# Patient Record
Sex: MALE | Race: WHITE | Employment: FULL TIME | URBAN - METROPOLITAN AREA
[De-identification: names, ages, dates, MRNs, and addresses within clinical notes are randomized per-mention and may not be internally consistent; named-entity substitution may affect disease eponyms.]

---

## 2018-06-29 ENCOUNTER — HOSPITAL ENCOUNTER (OUTPATIENT)
Dept: PREADMISSION TESTING | Age: 63
Discharge: HOME | End: 2018-06-29

## 2018-06-29 VITALS
WEIGHT: 225 LBS | OXYGEN SATURATION: 98 % | BODY MASS INDEX: 27.98 KG/M2 | DIASTOLIC BLOOD PRESSURE: 80 MMHG | HEART RATE: 63 BPM | RESPIRATION RATE: 20 BRPM | HEIGHT: 75 IN | SYSTOLIC BLOOD PRESSURE: 124 MMHG | TEMPERATURE: 97.9 F

## 2018-06-29 RX ORDER — ACETAMINOPHEN 500 MG
500 TABLET ORAL
COMMUNITY

## 2018-06-29 NOTE — PERIOP NOTES
[facility], [address]    MAIN OR [phone]    MAIN PRE OP [phone]    AMBULATORY PRE OP [phone]    PRE-ADMISSION TESTING [phone]       Surgery Date:   7/6/18      Is surgery arrival time given by surgeon? NO  If NO, [facility] staff will call you between 3 and 7pm the day before your surgery with your arrival time. (If your surgery is on a Monday, we will call you the Friday before.)    Call [phone] after 7pm Monday-Friday if you did not receive your arrival time.     Answers to Common Questions   When You  Arrive   Arrive at the [facility] [address] [facility] on the day of your surgery  Have your insurance card, photo ID, and any copayment (if needed)     Food   and   Drink   NO food or drink after midnight the night before surgery    This means NO water, gum, mints, coffee, juice, etc.  No alcohol (beer, wine, liquor) 24 hours before and after surgery     Medicine to   TAKE   Morning of Surgery   MEDICATIONS TO TAKE THE MORNING OF SURGERY WITH A SIP OF WATER:    none     Medicine  To  STOP   FOR PAIN   NO Aspirin for pain    NO Non-Steroidal Anti-Inflammatory Drugs (NSAIDs:   for example, Ibuprofen (Advil, Motrin), Naproxen (Aleve)   STOP herbal supplements and vitamins 1 week before surgery   You can take Tylenol - follow instructions on the bottle     Blood  Thinners    If you take Aspirin, Plavix, Coumadin, blood-thinning or anti-clot medicine, talk to your surgeon and/or follow the instructions from the doctor who told you to take that medicine     Clothing  Jewelry  Valuables  Bathing     CLOTHING   Wear loose, comfortable clothes   Wear glasses instead of contacts   Leave money, jewelry and valuables at home   No make-up, particularly mascara, the day of surgery   REMOVE ALL piercings, rings, and jewelry - leave at home   Wear hair loose or down; no pony-tails, buns, or metal hair clips    BATHING   Follow all special bath instructions (for total joint replacement, spine and bowel surgeries.)   If you shower the morning of surgery, please do not apply any lotions, powders, or deodorants afterwards. Do not shave or trim anywhere 24 hours before surgery. Going Home  or  Spending the Night    SAME-DAY SURGERY: You must have a responsible adult drive you home and stay with you 24 hours after surgery   ADMITS: If your doctor is keeping you into the hospital after surgery, leave personal belongings/luggage in your car until you have a hospital room number. Hospital discharge time is 12 noon  Drivers must be here before 12 noon unless you are told differently         Follow all instructions so your surgery wont be cancelled. Please, be on time. If a situation occurs and you are delayed the day of surgery, call  or [phone]. If your physical condition changes (like a fever, cold, flu, etc.) call your surgeon as soon as possible. The Preadmission Testing staff can be reached at 21 [phone]. OTHER SPECIAL INSTRUCTIONS:  Free  parking    The patient was contacted  in person. He  verbalize  understanding of all instructions and does not  need reinforcement.

## 2018-07-05 ENCOUNTER — ANESTHESIA EVENT (OUTPATIENT)
Dept: SURGERY | Age: 63
End: 2018-07-05
Payer: COMMERCIAL

## 2018-07-06 ENCOUNTER — HOSPITAL ENCOUNTER (OUTPATIENT)
Age: 63
Setting detail: OUTPATIENT SURGERY
Discharge: HOME | End: 2018-07-06
Attending: ORTHOPAEDIC SURGERY | Admitting: ORTHOPAEDIC SURGERY
Payer: COMMERCIAL

## 2018-07-06 ENCOUNTER — ANESTHESIA (OUTPATIENT)
Dept: SURGERY | Age: 63
End: 2018-07-06
Payer: COMMERCIAL

## 2018-07-06 VITALS
SYSTOLIC BLOOD PRESSURE: 132 MMHG | HEART RATE: 61 BPM | DIASTOLIC BLOOD PRESSURE: 83 MMHG | HEIGHT: 75 IN | RESPIRATION RATE: 15 BRPM | TEMPERATURE: 97.5 F | BODY MASS INDEX: 28.86 KG/M2 | OXYGEN SATURATION: 96 % | WEIGHT: 232.14 LBS

## 2018-07-06 PROCEDURE — A4565 SLINGS: HCPCS

## 2018-07-06 PROCEDURE — 74011000250 HC RX REV CODE- 250

## 2018-07-06 PROCEDURE — 76030000001 HC AMB SURG OR TIME 1 TO 1.5: Performed by: ORTHOPAEDIC SURGERY

## 2018-07-06 PROCEDURE — 74011250636 HC RX REV CODE- 250/636: Performed by: ORTHOPAEDIC SURGERY

## 2018-07-06 PROCEDURE — 76060000062 HC AMB SURG ANES 1 TO 1.5 HR: Performed by: ORTHOPAEDIC SURGERY

## 2018-07-06 PROCEDURE — 77030010816: Performed by: ORTHOPAEDIC SURGERY

## 2018-07-06 PROCEDURE — 64415 NJX AA&/STRD BRCH PLXS IMG: CPT

## 2018-07-06 PROCEDURE — 74011250636 HC RX REV CODE- 250/636

## 2018-07-06 PROCEDURE — 77030008495 HC TBNG ARTHSC IRR CNMD -B: Performed by: ORTHOPAEDIC SURGERY

## 2018-07-06 PROCEDURE — 74011250637 HC RX REV CODE- 250/637: Performed by: ORTHOPAEDIC SURGERY

## 2018-07-06 PROCEDURE — 77030032497 HC WRP SHLDR WO BGS SOLM -B

## 2018-07-06 PROCEDURE — 77030008684 HC TU ET CUF COVD -B: Performed by: ANESTHESIOLOGY

## 2018-07-06 PROCEDURE — 77030011640 HC PAD GRND REM COVD -A: Performed by: ORTHOPAEDIC SURGERY

## 2018-07-06 PROCEDURE — 77030002916 HC SUT ETHLN J&J -A: Performed by: ORTHOPAEDIC SURGERY

## 2018-07-06 PROCEDURE — 77030011930 HC WND ARTHRO ABLT S&N -C: Performed by: ORTHOPAEDIC SURGERY

## 2018-07-06 PROCEDURE — 77030003601 HC NDL NRV BLK BBMI -A

## 2018-07-06 PROCEDURE — 76210000050 HC AMBSU PH II REC 0.5 TO 1 HR: Performed by: ORTHOPAEDIC SURGERY

## 2018-07-06 PROCEDURE — 77030018835 HC SOL IRR LR ICUM -A: Performed by: ORTHOPAEDIC SURGERY

## 2018-07-06 PROCEDURE — 77030038020 HC MANFLD NEPTUNE STRY -B: Performed by: ORTHOPAEDIC SURGERY

## 2018-07-06 PROCEDURE — 77030019908 HC STETH ESOPH SIMS -A: Performed by: ANESTHESIOLOGY

## 2018-07-06 PROCEDURE — 77030020782 HC GWN BAIR PAWS FLX 3M -B

## 2018-07-06 PROCEDURE — 77030016678 HC BUR SHV4 S&N -B: Performed by: ORTHOPAEDIC SURGERY

## 2018-07-06 PROCEDURE — 77030006884 HC BLD SHV INCIS S&N -B: Performed by: ORTHOPAEDIC SURGERY

## 2018-07-06 PROCEDURE — 77030026438 HC STYL ET INTUB CARD -A: Performed by: ANESTHESIOLOGY

## 2018-07-06 PROCEDURE — 74011250636 HC RX REV CODE- 250/636: Performed by: ANESTHESIOLOGY

## 2018-07-06 PROCEDURE — 77030010428: Performed by: ORTHOPAEDIC SURGERY

## 2018-07-06 RX ORDER — PROPOFOL 10 MG/ML
INJECTION, EMULSION INTRAVENOUS AS NEEDED
Status: DISCONTINUED | OUTPATIENT
Start: 2018-07-06 | End: 2018-07-06 | Stop reason: HOSPADM

## 2018-07-06 RX ORDER — SODIUM CHLORIDE 0.9 % (FLUSH) 0.9 %
5-10 SYRINGE (ML) INJECTION AS NEEDED
Status: DISCONTINUED | OUTPATIENT
Start: 2018-07-06 | End: 2018-07-06 | Stop reason: HOSPADM

## 2018-07-06 RX ORDER — FENTANYL CITRATE 50 UG/ML
INJECTION, SOLUTION INTRAMUSCULAR; INTRAVENOUS AS NEEDED
Status: DISCONTINUED | OUTPATIENT
Start: 2018-07-06 | End: 2018-07-06 | Stop reason: HOSPADM

## 2018-07-06 RX ORDER — DEXAMETHASONE SODIUM PHOSPHATE 4 MG/ML
INJECTION, SOLUTION INTRA-ARTICULAR; INTRALESIONAL; INTRAMUSCULAR; INTRAVENOUS; SOFT TISSUE AS NEEDED
Status: DISCONTINUED | OUTPATIENT
Start: 2018-07-06 | End: 2018-07-06 | Stop reason: HOSPADM

## 2018-07-06 RX ORDER — HYDROMORPHONE HYDROCHLORIDE 2 MG/ML
.5-1 INJECTION, SOLUTION INTRAMUSCULAR; INTRAVENOUS; SUBCUTANEOUS
Status: DISCONTINUED | OUTPATIENT
Start: 2018-07-06 | End: 2018-07-06 | Stop reason: HOSPADM

## 2018-07-06 RX ORDER — ONDANSETRON 2 MG/ML
INJECTION INTRAMUSCULAR; INTRAVENOUS AS NEEDED
Status: DISCONTINUED | OUTPATIENT
Start: 2018-07-06 | End: 2018-07-06 | Stop reason: HOSPADM

## 2018-07-06 RX ORDER — MIDAZOLAM HYDROCHLORIDE 1 MG/ML
INJECTION, SOLUTION INTRAMUSCULAR; INTRAVENOUS AS NEEDED
Status: DISCONTINUED | OUTPATIENT
Start: 2018-07-06 | End: 2018-07-06 | Stop reason: HOSPADM

## 2018-07-06 RX ORDER — ONDANSETRON 2 MG/ML
4 INJECTION INTRAMUSCULAR; INTRAVENOUS AS NEEDED
Status: DISCONTINUED | OUTPATIENT
Start: 2018-07-06 | End: 2018-07-06 | Stop reason: HOSPADM

## 2018-07-06 RX ORDER — SODIUM CHLORIDE, SODIUM LACTATE, POTASSIUM CHLORIDE, CALCIUM CHLORIDE 600; 310; 30; 20 MG/100ML; MG/100ML; MG/100ML; MG/100ML
125 INJECTION, SOLUTION INTRAVENOUS CONTINUOUS
Status: DISCONTINUED | OUTPATIENT
Start: 2018-07-06 | End: 2018-07-06 | Stop reason: HOSPADM

## 2018-07-06 RX ORDER — SODIUM CHLORIDE 0.9 % (FLUSH) 0.9 %
5-10 SYRINGE (ML) INJECTION EVERY 8 HOURS
Status: DISCONTINUED | OUTPATIENT
Start: 2018-07-06 | End: 2018-07-06 | Stop reason: HOSPADM

## 2018-07-06 RX ORDER — LIDOCAINE HYDROCHLORIDE 20 MG/ML
INJECTION, SOLUTION EPIDURAL; INFILTRATION; INTRACAUDAL; PERINEURAL AS NEEDED
Status: DISCONTINUED | OUTPATIENT
Start: 2018-07-06 | End: 2018-07-06 | Stop reason: HOSPADM

## 2018-07-06 RX ORDER — ROCURONIUM BROMIDE 10 MG/ML
INJECTION, SOLUTION INTRAVENOUS AS NEEDED
Status: DISCONTINUED | OUTPATIENT
Start: 2018-07-06 | End: 2018-07-06 | Stop reason: HOSPADM

## 2018-07-06 RX ORDER — SUCCINYLCHOLINE CHLORIDE 20 MG/ML
INJECTION INTRAMUSCULAR; INTRAVENOUS AS NEEDED
Status: DISCONTINUED | OUTPATIENT
Start: 2018-07-06 | End: 2018-07-06 | Stop reason: HOSPADM

## 2018-07-06 RX ORDER — ROPIVACAINE HYDROCHLORIDE 5 MG/ML
INJECTION, SOLUTION EPIDURAL; INFILTRATION; PERINEURAL AS NEEDED
Status: DISCONTINUED | OUTPATIENT
Start: 2018-07-06 | End: 2018-07-06 | Stop reason: HOSPADM

## 2018-07-06 RX ORDER — LIDOCAINE HYDROCHLORIDE 10 MG/ML
0.1 INJECTION, SOLUTION EPIDURAL; INFILTRATION; INTRACAUDAL; PERINEURAL AS NEEDED
Status: DISCONTINUED | OUTPATIENT
Start: 2018-07-06 | End: 2018-07-06 | Stop reason: HOSPADM

## 2018-07-06 RX ORDER — CEFAZOLIN SODIUM/WATER 2 G/20 ML
2 SYRINGE (ML) INTRAVENOUS ONCE
Status: COMPLETED | OUTPATIENT
Start: 2018-07-06 | End: 2018-07-06

## 2018-07-06 RX ORDER — HYDROCODONE BITARTRATE AND ACETAMINOPHEN 7.5; 325 MG/1; MG/1
1 TABLET ORAL ONCE
Status: COMPLETED | OUTPATIENT
Start: 2018-07-06 | End: 2018-07-06

## 2018-07-06 RX ADMIN — HYDROCODONE BITARTRATE AND ACETAMINOPHEN 1 TABLET: 7.5; 325 TABLET ORAL at 10:41

## 2018-07-06 RX ADMIN — FENTANYL CITRATE 25 MCG: 50 INJECTION, SOLUTION INTRAMUSCULAR; INTRAVENOUS at 09:04

## 2018-07-06 RX ADMIN — FENTANYL CITRATE 50 MCG: 50 INJECTION, SOLUTION INTRAMUSCULAR; INTRAVENOUS at 07:02

## 2018-07-06 RX ADMIN — LIDOCAINE HYDROCHLORIDE 60 MG: 20 INJECTION, SOLUTION EPIDURAL; INFILTRATION; INTRACAUDAL; PERINEURAL at 07:50

## 2018-07-06 RX ADMIN — FENTANYL CITRATE 50 MCG: 50 INJECTION, SOLUTION INTRAMUSCULAR; INTRAVENOUS at 07:43

## 2018-07-06 RX ADMIN — ONDANSETRON 4 MG: 2 INJECTION INTRAMUSCULAR; INTRAVENOUS at 09:06

## 2018-07-06 RX ADMIN — SODIUM CHLORIDE, POTASSIUM CHLORIDE, SODIUM LACTATE AND CALCIUM CHLORIDE: 600; 310; 30; 20 INJECTION, SOLUTION INTRAVENOUS at 06:46

## 2018-07-06 RX ADMIN — FENTANYL CITRATE 25 MCG: 50 INJECTION, SOLUTION INTRAMUSCULAR; INTRAVENOUS at 09:06

## 2018-07-06 RX ADMIN — DEXAMETHASONE SODIUM PHOSPHATE 4 MG: 4 INJECTION, SOLUTION INTRA-ARTICULAR; INTRALESIONAL; INTRAMUSCULAR; INTRAVENOUS; SOFT TISSUE at 07:05

## 2018-07-06 RX ADMIN — ROCURONIUM BROMIDE 5 MG: 10 INJECTION, SOLUTION INTRAVENOUS at 07:50

## 2018-07-06 RX ADMIN — PROPOFOL 200 MG: 10 INJECTION, EMULSION INTRAVENOUS at 07:50

## 2018-07-06 RX ADMIN — MIDAZOLAM HYDROCHLORIDE 2 MG: 1 INJECTION, SOLUTION INTRAMUSCULAR; INTRAVENOUS at 06:59

## 2018-07-06 RX ADMIN — Medication 2 G: at 08:09

## 2018-07-06 RX ADMIN — FENTANYL CITRATE 50 MCG: 50 INJECTION, SOLUTION INTRAMUSCULAR; INTRAVENOUS at 06:59

## 2018-07-06 RX ADMIN — FENTANYL CITRATE 100 MCG: 50 INJECTION, SOLUTION INTRAMUSCULAR; INTRAVENOUS at 07:50

## 2018-07-06 RX ADMIN — DEXAMETHASONE SODIUM PHOSPHATE 4 MG: 4 INJECTION, SOLUTION INTRA-ARTICULAR; INTRALESIONAL; INTRAMUSCULAR; INTRAVENOUS; SOFT TISSUE at 08:19

## 2018-07-06 RX ADMIN — SUCCINYLCHOLINE CHLORIDE 100 MG: 20 INJECTION INTRAMUSCULAR; INTRAVENOUS at 07:50

## 2018-07-06 RX ADMIN — ROPIVACAINE HYDROCHLORIDE 30 ML: 5 INJECTION, SOLUTION EPIDURAL; INFILTRATION; PERINEURAL at 07:06

## 2018-07-06 NOTE — IP AVS SNAPSHOT
[address] 
[phone] Patient: Russ Irene MRN: [id number] LSO:[id number] About your hospitalization You were admitted on:  July 6, 2018 You last received care in the:  [facility] PACU You were discharged on:  July 6, 2018 Why you were hospitalized Your primary diagnosis was:  Not on File Follow-up Information Follow up With Details Comments Contact Info Provider Unknown   Patient not available to ask Discharge Orders None A check boubacar indicates which time of day the medication should be taken. My Medications CONTINUE taking these medications Instructions Each Dose to Equal  
 Morning Noon Evening Bedtime TYLENOL EXTRA STRENGTH 500 mg tablet Generic drug:  acetaminophen Your next dose is:  As needed Take 500 mg by mouth every six (6) hours as needed for Pain. 500 mg Discharge Instructions [address] [place] EFFECT GUIDE The [address] [place] EFFECT GUIDE was provided to the PATIENT AND CARE PROVIDER. Information provided includes instruction about drug purpose and common side effects for the following medications:  
   Niki Ripple AND IBUPROFEN Going Home After A Peripheral Nerve Block Patient Information The anesthesiology team has provided for your pain control through a technique called regional anesthesia. As the name implies, anesthesia (decreased or no pain, sensation, or motor control) has been provided to a specific region, whether that be an arm or a leg. How does this happen?  you might ask.  
 
While you were sleepy, the anesthesia provider provided medicine to a specific group of nerves either in the neck/shoulder region or in the groin and/or buttock region, similar to the way a dentist might numb a tooth (teeth.)  They typically use an ultrasound machine to know where the medicine is placed in relation to the nerves they wish to numb up or block.   What this means is that for the next few hours, you should expect to have a numb limb. Below are some things we wish for you to read and be familiar with concerning your anesthetized limb. Caring For a Blocked Body Part General Considerations: The numbness may last up to 24 hours You must protect your arm or leg. The blocked extremity is numb, weak, and difficult for your brain to locate and communicate with. To do this you should: 
Pay attention to the position of the blocked limb at all times. Be very careful when placing hot or cod items on a numb limb. You could cause tissue damage like burns or frostbite if you are unable to feel temperature. Carefully follow your discharge instructions regarding the use of these therapies in you post-operative care. Carefully pad the affected limb. Normally we continually move and adjust the position of our bodies without thinking about it. This movement and continuous repositioning helps to prevent injuries from immobility. When a limb is numb it still requires this care Be extremely careful not to bump or hit the numb body part. This can result in an unrecognized injury, at lease until the blocked limb wakes up. It can also result in worse pain of your already post-surgical limb. Arm/Shoulder Blocks: You may experience a droopy eyelid, nasal stuffiness, and redness of the eye after receiving an arm/shoulder block. This is called Fiorellas Syndrome, and is very common. There is no need for concern. You may also experience mild hoarseness, but all of these symptoms should resolve within 24 hours. Some patients may experience mild shortness of breath. A sitting position will help alleviate this and it should resolve within 24 hours.   If you experience significant or progressive worsening of the shortness of breath, seek medical attention immediately. Knee/Foot Blocks: 
DO NOT use the blocked leg to walk, balance or support yourself. Your leg will not be able to balance your weight properly while any part of your leg is numb. You are at a RISK for [address]. Be careful not to drag your foot along the ground or stub your toes. Contact Phone Numbers CALL 911 IN CASE OF AN EMERGENCY. For all other non-emergency inquiries call the [facility]  at [phone] and ask for the anesthesiologist on call to be paged. Going Home After A Peripheral Nerve Block Patient Information The anesthesiology team has provided for your pain control through a technique called regional anesthesia. As the name implies, anesthesia (decreased or no pain, sensation, or motor control) has been provided to a specific region, whether that be an arm or a leg. How does this happen?  you might ask. While you were sleepy, the anesthesia provider provided medicine to a specific group of nerves either in the neck/shoulder region or in the groin and/or buttock region, similar to the way a dentist might numb a tooth (teeth.)  They typically use an ultrasound machine to know where the medicine is placed in relation to the nerves they wish to numb up or block.   What this means is that for the next few hours, you should expect to have a numb limb. Below are some things we wish for you to read and be familiar with concerning your anesthetized limb. Caring For a Blocked Body Part General Considerations: The numbness may last up to 24 hours You must protect your arm or leg. The blocked extremity is numb, weak, and difficult for your brain to locate and communicate with. To do this you should: 
Pay attention to the position of the blocked limb at all times. Be very careful when placing hot or cod items on a numb limb. You could cause tissue damage like burns or frostbite if you are unable to feel temperature. Carefully follow your discharge instructions regarding the use of these therapies in you post-operative care. Carefully pad the affected limb. Normally we continually move and adjust the position of our bodies without thinking about it. This movement and continuous repositioning helps to prevent injuries from immobility. When a limb is numb it still requires this care Be extremely careful not to bump or hit the numb body part. This can result in an unrecognized injury, at lease until the blocked limb wakes up. It can also result in worse pain of your already post-surgical limb. Arm/Shoulder Blocks: You may experience a droopy eyelid, nasal stuffiness, and redness of the eye after receiving an arm/shoulder block. This is called Fiorellas Syndrome, and is very common. There is no need for concern. You may also experience mild hoarseness, but all of these symptoms should resolve within 24 hours. Some patients may experience mild shortness of breath. A sitting position will help alleviate this and it should resolve within 24 hours. If you experience significant or progressive worsening of the shortness of breath, seek medical attention immediately. Knee/Foot Blocks: 
DO NOT use the blocked leg to walk, balance or support yourself. Your leg will not be able to balance your weight properly while any part of your leg is numb. You are at a RISK for [address]. Be careful not to drag your foot along the ground or stub your toes. Contact Phone Numbers CALL 911 IN CASE OF AN EMERGENCY. For all other non-emergency inquiries call the [facility]  at [phone] and ask for the anesthesiologist on call to be paged. · Introducing [facility] [place] [facility]!    
 [employer] introduces XGIMI patient portal. Now you can access parts of your medical record, email your doctor's office, and request medication refills online. 1. In your internet browser, go to [email or web address] 2. Click on the First Time User? Click Here link in the Sign In box. You will see the New Member Sign Up page. 3. Enter your Syntricity Access Code exactly as it appears below. You will not need to use this code after youve completed the sign-up process. If you do not sign up before the expiration date, you must request a new code. · Syntricity Access Code: [email or web address] Expires: 9/27/2018  8:21 AM 
 
4. Enter the last four digits of your Social Security Number (xxxx) and Date of Birth (mm/dd/yyyy) as indicated and click Submit. You will be taken to the next sign-up page. 5. Create a Syntricity ID. This will be your Syntricity login ID and cannot be changed, so think of one that is secure and easy to remember. 6. Create a Syntricity password. You can change your password at any time. 7. Enter your Password Reset Question and Answer. This can be used at a later time if you forget your password. 8. Enter your e-mail address. You will receive e-mail notification when new information is available in [address] 9. Click Sign Up. You can now view and download portions of your medical record. 10. Click the Download Summary menu link to download a portable copy of your medical information. If you have questions, please visit the Frequently Asked Questions section of the Syntricity website. Remember, Syntricity is NOT to be used for urgent needs. For medical emergencies, dial 911. Now available from your iPhone and Android! Introducing Rao Reynolds As a [employer] patient, I wanted to make you aware of our electronic visit tool called Rao Reynolds. [employer] 24/7 allows you to connect within minutes with a medical provider 24 hours a day, seven days a week via a mobile device or tablet or logging into a secure website from your computer. You can access PaperShare from anywhere in the [place]. A virtual visit might be right for you when you have a simple condition and feel like you just dont want to get out of bed, or cant get away from work for an appointment, when your regular [address] [place] [address] provider is not available (evenings, weekends or holidays), or when youre out of town and need minor care. Electronic visits cost only $49 and if the [address] [place] [address] 24/7 provider determines a prescription is needed to treat your condition, one can be electronically transmitted to a nearby pharmacy*. Please take a moment to enroll today if you have not already done so. The enrollment process is free and takes just a few minutes. To enroll, please download the [employer] [facility] 24/7 manuel to your tablet or phone, or visit [email or web address] to enroll on your computer. And, as an [address] [place] [address] patient with a [email or web address].es account, the results of your visits will be scanned into your electronic medical record and your primary care provider will be able to view the scanned results. We urge you to continue to see your regular [address] [place] [address] provider for your ongoing medical care. And while your primary care provider may not be the one available when you seek a MedicAnimal.[email or web address] virtual visit, the peace of mind you get from getting a real diagnosis real time can be priceless. For more information on PaperShare, view our Frequently Asked Questions (FAQs) at [email or web address]. Sincerely, 
 
Joey Jolley MD 
Chief Medical Officer Darin8 Annmarie Aparicio *:  certain medications cannot be prescribed via MedicAnimal.comnifin Providers Seen During Your Hospitalization Provider Specialty Primary office phone Liberty Peterson MD Orthopedic Surgery [phone] Your Primary Care Physician (PCP) Primary Care Physician Office Phone Office Fax UNKNOWN, PROVIDER ** None ** ** None ** You are allergic to the following No active allergies Recent Documentation Height Weight BMI Smoking Status 1.905 m 105.3 kg 29.02 kg/m2 Current Every Day Smoker Emergency Contacts Name Discharge Info Relation Home Work Mobile [address] [employer] CAREGIVER [3] Spouse [3] [phone] Patient Belongings The following personal items are in your possession at time of discharge: 
  Dental Appliances: None         Home Medications: None   Jewelry: None  Clothing: Pants, Undergarments, Shirt, Footwear    Other Valuables: Eyeglasses (glasses given to wife in pre=-op area)  Personal Items Sent to Safe: none Please provide this summary of care documentation to your next provider. Signatures-by signing, you are acknowledging that this After Visit Summary has been reviewed with you and you have received a copy. Patient Signature:  ____________________________________________________________ Date:  ____________________________________________________________  
  
Andrés Uriostegui Provider Signature:  ____________________________________________________________ Date:  ____________________________________________________________

## 2018-07-06 NOTE — IP AVS SNAPSHOT
Summary of Care Report The Summary of Care report has been created to help improve care coordination. Users with access to [employer] or [address] (Web-based application) may access additional patient information including the Discharge Summary. If you are not currently a [address] user and need more information, please call the number listed below in the [place] [address] section and ask to be connected with Medical Records. Facility Information Name Address Phone [address] [facility] [id number] [phone] Patient Information Patient Name Sex  Matt Thomas ([id number]) Male 1955 Discharge Information Admitting Provider Service Area Unit Cory Blum MD / [facility] / [phone] Discharge Provider Discharge Date/Time Discharge Disposition Destination (none) 2018 Morning (Pending) AHR (none) Patient Language Language ENGLISH [13] You are allergic to the following No active allergies Current Discharge Medication List  
  
CONTINUE these medications which have NOT CHANGED Dose & Instructions Dispensing Information Comments TYLENOL EXTRA STRENGTH 500 mg tablet Generic drug:  acetaminophen Dose:  500 mg Take 500 mg by mouth every six (6) hours as needed for Pain. Refills:  0 Surgery Information ID Date/Time Status Primary Surgeon All Procedures Location  [id number] 2018 0800 Unposted Cory Blum MD RIGHT SHOULDER ARTHROSCOPY WITH SUBACROMIAL DECOMPRESSION DISTAL CLAVICLE RESECTION AND REMOVAL OF LOOSE BODIES SF AMBULATORY OR    
 RIGHT SHOULDER ARTHROSCOPY WITH SUBACROMIAL DECOMPRESSION DISTAL CLAVICLE RESECTION AND REMOVAL OF LOOSE BODIES:  RIGHT SHOULDER ARTHROSCOPY WITH SUBACROMIAL DECOMPRESSION DISTAL CLAVICLE RESECTION AND REMOVAL OF LOOSE BODIES Follow-up Information Follow up With Details Comments Contact Info Provider Unknown   Patient not available to ask Discharge Instructions [facility] EFFECT GUIDE The Ramy Hernandes EFFECT GUIDE was provided to the PATIENT AND CARE PROVIDER. Information provided includes instruction about drug purpose and common side effects for the following medications:  
   Lorrie Lopez AND IBUPROFEN Going Home After A Peripheral Nerve Block Patient Information The anesthesiology team has provided for your pain control through a technique called regional anesthesia. As the name implies, anesthesia (decreased or no pain, sensation, or motor control) has been provided to a specific region, whether that be an arm or a leg. How does this happen?  you might ask. While you were sleepy, the anesthesia provider provided medicine to a specific group of nerves either in the neck/shoulder region or in the groin and/or buttock region, similar to the way a dentist might numb a tooth (teeth.)  They typically use an ultrasound machine to know where the medicine is placed in relation to the nerves they wish to numb up or block.   What this means is that for the next few hours, you should expect to have a numb limb. Below are some things we wish for you to read and be familiar with concerning your anesthetized limb. Caring For a Blocked Body Part General Considerations: The numbness may last up to 24 hours You must protect your arm or leg. The blocked extremity is numb, weak, and difficult for your brain to locate and communicate with. To do this you should: 
Pay attention to the position of the blocked limb at all times. Be very careful when placing hot or cod items on a numb limb.   You could cause tissue damage like burns or frostbite if you are unable to feel temperature. Carefully follow your discharge instructions regarding the use of these therapies in you post-operative care. Carefully pad the affected limb. Normally we continually move and adjust the position of our bodies without thinking about it. This movement and continuous repositioning helps to prevent injuries from immobility. When a limb is numb it still requires this care Be extremely careful not to bump or hit the numb body part. This can result in an unrecognized injury, at lease until the blocked limb wakes up. It can also result in worse pain of your already post-surgical limb. Arm/Shoulder Blocks: You may experience a droopy eyelid, nasal stuffiness, and redness of the eye after receiving an arm/shoulder block. This is called Fiorellas Syndrome, and is very common. There is no need for concern. You may also experience mild hoarseness, but all of these symptoms should resolve within 24 hours. Some patients may experience mild shortness of breath. A sitting position will help alleviate this and it should resolve within 24 hours. If you experience significant or progressive worsening of the shortness of breath, seek medical attention immediately. Knee/Foot Blocks: 
DO NOT use the blocked leg to walk, balance or support yourself. Your leg will not be able to balance your weight properly while any part of your leg is numb. You are at a RISK for [address]. Be careful not to drag your foot along the ground or stub your toes. Contact Phone Numbers CALL 911 IN CASE OF AN EMERGENCY. For all other non-emergency inquiries call the [facility]  at [phone] and ask for the anesthesiologist on call to be paged. Going Home After A Peripheral Nerve Block Patient Information The anesthesiology team has provided for your pain control through a technique called regional anesthesia. As the name implies, anesthesia (decreased or no pain, sensation, or motor control) has been provided to a specific region, whether that be an arm or a leg. How does this happen?  you might ask. While you were sleepy, the anesthesia provider provided medicine to a specific group of nerves either in the neck/shoulder region or in the groin and/or buttock region, similar to the way a dentist might numb a tooth (teeth.)  They typically use an ultrasound machine to know where the medicine is placed in relation to the nerves they wish to numb up or block.   What this means is that for the next few hours, you should expect to have a numb limb. Below are some things we wish for you to read and be familiar with concerning your anesthetized limb. Caring For a Blocked Body Part General Considerations: The numbness may last up to 24 hours You must protect your arm or leg. The blocked extremity is numb, weak, and difficult for your brain to locate and communicate with. To do this you should: 
Pay attention to the position of the blocked limb at all times. Be very careful when placing hot or cod items on a numb limb. You could cause tissue damage like burns or frostbite if you are unable to feel temperature. Carefully follow your discharge instructions regarding the use of these therapies in you post-operative care. Carefully pad the affected limb. Normally we continually move and adjust the position of our bodies without thinking about it. This movement and continuous repositioning helps to prevent injuries from immobility. When a limb is numb it still requires this care Be extremely careful not to bump or hit the numb body part. This can result in an unrecognized injury, at lease until the blocked limb wakes up. It can also result in worse pain of your already post-surgical limb. Arm/Shoulder Blocks: You may experience a droopy eyelid, nasal stuffiness, and redness of the eye after receiving an arm/shoulder block. This is called Fiorellas Syndrome, and is very common. There is no need for concern. You may also experience mild hoarseness, but all of these symptoms should resolve within 24 hours. Some patients may experience mild shortness of breath. A sitting position will help alleviate this and it should resolve within 24 hours. If you experience significant or progressive worsening of the shortness of breath, seek medical attention immediately. Knee/Foot Blocks: 
DO NOT use the blocked leg to walk, balance or support yourself. Your leg will not be able to balance your weight properly while any part of your leg is numb. You are at a RISK for [address]. Be careful not to drag your foot along the ground or stub your toes. Contact Phone Numbers CALL 911 IN CASE OF AN EMERGENCY. For all other non-emergency inquiries call the [facility]  at [phone] and ask for the anesthesiologist on call to be paged. ·  
 
Chart Review Routing History No Routing History on File

## 2018-07-06 NOTE — ANESTHESIA PREPROCEDURE EVALUATION
Anesthetic History   No history of anesthetic complications            Review of Systems / Medical History  Patient summary reviewed, nursing notes reviewed and pertinent labs reviewed    Pulmonary  Within defined limits                 Neuro/Psych   Within defined limits           Cardiovascular  Within defined limits                     GI/Hepatic/Renal  Within defined limits         PUD     Endo/Other  Within defined limits           Other Findings              Physical Exam    Airway  Mallampati: II  TM Distance: 4 - 6 cm  Neck ROM: normal range of motion   Mouth opening: Normal     Cardiovascular    Rhythm: regular  Rate: normal         Dental  No notable dental hx       Pulmonary  Breath sounds clear to auscultation               Abdominal         Other Findings            Anesthetic Plan    ASA: 2  Anesthesia type: general      Post-op pain plan if not by surgeon: peripheral nerve block single      Anesthetic plan and risks discussed with: Patient

## 2018-07-06 NOTE — ANESTHESIA PROCEDURE NOTES
Peripheral Block    Start time: 7/6/2018 6:59 AM  End time: 7/6/2018 7:06 AM  Performed by: Leanne Mendoza  Authorized by: Kasi Hameed       Pre-procedure: Indications: at surgeon's request and post-op pain management    Preanesthetic Checklist: patient identified, risks and benefits discussed, site marked, timeout performed, anesthesia consent given and patient being monitored    Timeout Time: 06:59          Block Type:   Block Type:   Interscalene  Laterality:  Right  Monitoring:  Continuous pulse ox, frequent vital sign checks, heart rate, responsive to questions and oxygen  Injection Technique:  Single shot  Procedures: ultrasound guided    Patient Position: supine  Prep: chlorhexidine    Location:  Interscalene  Needle Type:  Stimuplex  Needle Gauge:  22 G  Needle Localization:  Nerve stimulator and ultrasound guidance  Medication Injected:  0.5%  ropivacaine  Volume (mL):  30    Assessment:  Number of attempts:  1  Injection Assessment:  Incremental injection every 5 mL, local visualized surrounding nerve on ultrasound, negative aspiration for blood, no paresthesia and no intravascular symptoms  Patient tolerance:  Patient tolerated the procedure well with no immediate complications  Decadron 4 mg added to block

## 2018-07-06 NOTE — OP NOTES
[facility] 81  OPERATIVE REPORT    Iman Pappas  MR#: [id number]  : 1955  ACCOUNT #: [de-identified]   DATE OF SERVICE: 2018    PREOPERATIVE DIAGNOSIS:    1. Right shoulder impingement syndrome. 2.  Right shoulder acromioclavicular joint arthritis. 3.  Right shoulder loose bodies. POSTOPERATIVE DIAGNOSES:   1. Right shoulder impingement syndrome. 2.  Right shoulder acromioclavicular joint arthritis. 3.  Right shoulder loose bodies. 4.  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 PROCEDURES PERFORMED:  1. Right shoulder arthroscopy with subacromial decompression. 2.  Right shoulder arthroscopy with distal clavicle resection. 3.  Right shoulder arthroscopy with extensive debridement of anterior, posterior labrum. 4.  Right shoulder arthroscopy with removal of loose bodies. SURGEON:  Devika Bergman MD      ASSISTANT:  Zaira    ANESTHESIA:  General plus block. COMPLICATIONS:  None. ESTIMATED BLOOD LOSS:  Minimal.    SPECIMENS REMOVED:  None. DRAINS:  None. IMPLANTS:  None. DESCRIPTION OF PROCEDURE:  The patient brought to the operating room, given general anesthesia, placed in a left lateral decubitus position on a beanbag. All bony prominences were protected. Head and neck aligned and protected. SCDs used, prepped and draped in a sterile fashion with ChloraPrep. Ten-pound suspension utilized. Posterior portal created. The arthroscope inserted into the joint and examined systematically, diffuse grade IV chondromalacia of the glenoid and humeral head with unstable tears of the labrum circumferentially. Undersurface of the rotator cuff intact, biceps tendon intact without subluxation. Extensive anterior and posterior debridement of the labrum through both anterior and posterior working portals. There were 3 loose bodies in the subscapularis recess that were removed.   An accessory portal had to be created anteriorly to remove the final loose body in the subscap recess. Loose bodies were greater than 5 mm in size. Next, the arthroscope redirected subacromially where there was fraying of the coracoacromial ligament and absence of articular cartilage at the [facility] [place], operated by [facility] joint with inferior spurring of the distal clavicle. The rotator cuff was carefully probed and it was intact. The coracoacromial ligament was released with a radiofrequency device. There was a type 3 acromial hook that was removed with a bur. The acromioplasty was performed with a cutting block technique. Next, a distal clavicle resection performed creating a 10 mm space between the clavicle and acromion while preserving superior and posterior ligaments. Subacromial space was irrigated. The portals were closed with nylon. Sterile dressing applied. Patient awakened, returned to recovery in stable condition. Family notified of his condition.       MD BONI Mahajan / [place]  D: 07/06/2018 09:14     T: 07/06/2018 10:02  JOB #: [id number]

## 2018-07-06 NOTE — BRIEF OP NOTE
BRIEF OPERATIVE NOTE    Date of Procedure: 7/6/2018   Preoperative Diagnosis: RIGHT SHOULDER IMPINGEMENT  Postoperative Diagnosis: RIGHT SHOULDER IMPINGEMENT, ACj DJD, LB's    Procedure(s):  RIGHT SHOULDER ARTHROSCOPY WITH SUBACROMIAL DECOMPRESSION DISTAL CLAVICLE RESECTION AND REMOVAL OF LOOSE BODIES  Surgeon(s) and Role:     * Nathaniel Freed MD - Primary         Surgical Assistant: Peggy Pinzon    Surgical Staff:  Circ-1: Wes Hameed RN  Scrub Tech-1: Isabella Ayers  Surg Asst-1: [facility]  Event Time In   Incision Start 5301   Incision Close      Anesthesia: General   Estimated Blood Loss: minimal  Specimens: * No specimens in log *   Findings: RIGHT SHOULDER IMPINGEMENT, ACj DJD, LB's   Complications: none  Implants: * No implants in log *

## 2018-07-06 NOTE — IP AVS SNAPSHOT
[address] [id number] Patient: Leobardo Chavira MRN: [id number] GWD:[id number]/[id number] A check boubacar indicates which time of day the medication should be taken. My Medications CONTINUE taking these medications Instructions Each Dose to Equal  
 Morning Noon Evening Bedtime TYLENOL EXTRA STRENGTH 500 mg tablet Generic drug:  acetaminophen Your next dose is:  As needed Take 500 mg by mouth every six (6) hours as needed for Pain.   
 500 mg

## 2018-07-06 NOTE — DISCHARGE INSTRUCTIONS
[facility] MEDICATION AND   SIDE EFFECT GUIDE    The [facility] MEDICATION AND SIDE EFFECT GUIDE was provided to the PATIENT AND CARE PROVIDER. Information provided includes instruction about drug purpose and common side effects for the following medications:      [address] After A  Peripheral Nerve Block    Patient Information    The anesthesiology team has provided for your pain control through a technique called regional anesthesia. As the name implies, anesthesia (decreased or no pain, sensation, or motor control) has been provided to a specific region, whether that be an arm or a leg. How does this happen?  you might ask. While you were sleepy, the anesthesia provider provided medicine to a specific group of nerves either in the neck/shoulder region or in the groin and/or buttock region, similar to the way a dentist might numb a tooth (teeth.)  They typically use an ultrasound machine to know where the medicine is placed in relation to the nerves they wish to numb up or block.   What this means is that for the next few hours, you should expect to have a numb limb. Below are some things we wish for you to read and be familiar with concerning your anesthetized limb. Caring For a Blocked Body Part    General Considerations: The numbness may last up to 24 hours  You must protect your arm or leg. The blocked extremity is numb, weak, and difficult for your brain to locate and communicate with. To do this you should:  Pay attention to the position of the blocked limb at all times. Be very careful when placing hot or cod items on a numb limb. You could cause tissue damage like burns or frostbite if you are unable to feel temperature. Carefully follow your discharge instructions regarding the use of these therapies in you post-operative care. Carefully pad the affected limb.   Normally we continually move and adjust the position of our bodies without thinking about it. This movement and continuous repositioning helps to prevent injuries from immobility. When a limb is numb it still requires this care  Be extremely careful not to bump or hit the numb body part. This can result in an unrecognized injury, at lease until the blocked limb wakes up. It can also result in worse pain of your already post-surgical limb. Arm/Shoulder Blocks: You may experience a droopy eyelid, nasal stuffiness, and redness of the eye after receiving an arm/shoulder block. This is called Fiorellas Syndrome, and is very common. There is no need for concern. You may also experience mild hoarseness, but all of these symptoms should resolve within 24 hours. Some patients may experience mild shortness of breath. A sitting position will help alleviate this and it should resolve within 24 hours. If you experience significant or progressive worsening of the shortness of breath, seek medical attention immediately. Knee/Foot Blocks:  DO NOT use the blocked leg to walk, balance or support yourself. Your leg will not be able to balance your weight properly while any part of your leg is numb. You are at a RISK for [address]. Be careful not to drag your foot along the ground or stub your toes. Contact Phone Numbers    CALL 911 IN CASE OF AN EMERGENCY. For all other non-emergency inquiries call the [facility]  at [phone] and ask for the anesthesiologist on call to be paged. Going Home After A  Peripheral Nerve Block    Patient Information    The anesthesiology team has provided for your pain control through a technique called regional anesthesia. As the name implies, anesthesia (decreased or no pain, sensation, or motor control) has been provided to a specific region, whether that be an arm or a leg. How does this happen?  you might ask.     While you were sleepy, the anesthesia provider provided medicine to a specific group of nerves either in the neck/shoulder region or in the groin and/or buttock region, similar to the way a dentist might numb a tooth (teeth.)  They typically use an ultrasound machine to know where the medicine is placed in relation to the nerves they wish to numb up or block.   What this means is that for the next few hours, you should expect to have a numb limb. Below are some things we wish for you to read and be familiar with concerning your anesthetized limb. Caring For a Blocked Body Part    General Considerations: The numbness may last up to 24 hours  You must protect your arm or leg. The blocked extremity is numb, weak, and difficult for your brain to locate and communicate with. To do this you should:  Pay attention to the position of the blocked limb at all times. Be very careful when placing hot or cod items on a numb limb. You could cause tissue damage like burns or frostbite if you are unable to feel temperature. Carefully follow your discharge instructions regarding the use of these therapies in you post-operative care. Carefully pad the affected limb. Normally we continually move and adjust the position of our bodies without thinking about it. This movement and continuous repositioning helps to prevent injuries from immobility. When a limb is numb it still requires this care  Be extremely careful not to bump or hit the numb body part. This can result in an unrecognized injury, at lease until the blocked limb wakes up. It can also result in worse pain of your already post-surgical limb. Arm/Shoulder Blocks: You may experience a droopy eyelid, nasal stuffiness, and redness of the eye after receiving an arm/shoulder block. This is called Fiorellas Syndrome, and is very common. There is no need for concern. You may also experience mild hoarseness, but all of these symptoms should resolve within 24 hours. Some patients may experience mild shortness of breath.   A sitting position will help alleviate this and it should resolve within 24 hours. If you experience significant or progressive worsening of the shortness of breath, seek medical attention immediately. Knee/Foot Blocks:  DO NOT use the blocked leg to walk, balance or support yourself. Your leg will not be able to balance your weight properly while any part of your leg is numb. You are at a RISK for [address]. Be careful not to drag your foot along the ground or stub your toes. Contact Phone Numbers    CALL 911 IN CASE OF AN EMERGENCY. For all other non-emergency inquiries call the [facility]  at [phone] and ask for the anesthesiologist on call to be paged.   ·

## 2018-07-06 NOTE — ANESTHESIA POSTPROCEDURE EVALUATION
Post-Anesthesia Evaluation and Assessment    Patient: Jovon Ro MRN: [id number]  SSN: [id number]    YOB: 1955  Age: 66 y.o. Sex: male       Cardiovascular Function/Vital Signs  Visit Vitals    /83    Pulse 61    Temp 36.4 °C (97.5 °F)    Resp 15    Ht 6' 3\" (1.905 m)    Wt 105.3 kg (232 lb 2.3 oz)    SpO2 96%    BMI 29.02 kg/m2       Patient is status post general anesthesia for Procedure(s):  RIGHT SHOULDER ARTHROSCOPY WITH SUBACROMIAL DECOMPRESSION DISTAL CLAVICLE RESECTION AND REMOVAL OF LOOSE BODIES. Nausea/Vomiting: None    Postoperative hydration reviewed and adequate. Pain:  Pain Scale 1: Numeric (0 - 10) (07/06/18 0925)  Pain Intensity 1: 0 (07/06/18 0925)   Managed    Neurological Status:   Neuro (WDL): Exceptions to WDL (07/06/18 0925)  Neuro  LUE Motor Response: Purposeful (07/06/18 0925)  LLE Motor Response: Purposeful (07/06/18 0925)  RUE Motor Response: Floppy (07/06/18 0925)  RLE Motor Response: Purposeful (07/06/18 0925)   At baseline    Mental Status and Level of Consciousness: Arousable    Pulmonary Status:   O2 Device: Nasal cannula (07/06/18 0925)   Adequate oxygenation and airway patent    Complications related to anesthesia: None    Post-anesthesia assessment completed.  No concerns    Signed By: Saad Reyes MD     July 6, 2018

## 2022-05-27 ENCOUNTER — APPOINTMENT (OUTPATIENT)
Dept: GENERAL RADIOLOGY | Age: 67
End: 2022-05-27
Attending: EMERGENCY MEDICINE
Payer: COMMERCIAL

## 2022-05-27 ENCOUNTER — HOSPITAL ENCOUNTER (EMERGENCY)
Age: 67
Discharge: ANOTHER HOSPITAL | End: 2022-05-27
Attending: EMERGENCY MEDICINE
Payer: COMMERCIAL

## 2022-05-27 ENCOUNTER — HOSPITAL ENCOUNTER (OUTPATIENT)
Age: 67
Setting detail: OBSERVATION
Discharge: HOME | End: 2022-05-29
Attending: EMERGENCY MEDICINE | Admitting: STUDENT IN AN ORGANIZED HEALTH CARE EDUCATION/TRAINING PROGRAM
Payer: COMMERCIAL

## 2022-05-27 ENCOUNTER — APPOINTMENT (OUTPATIENT)
Dept: NON INVASIVE DIAGNOSTICS | Age: 67
End: 2022-05-27
Attending: EMERGENCY MEDICINE
Payer: COMMERCIAL

## 2022-05-27 ENCOUNTER — APPOINTMENT (OUTPATIENT)
Dept: CT IMAGING | Age: 67
End: 2022-05-27
Attending: SURGERY
Payer: COMMERCIAL

## 2022-05-27 VITALS
HEIGHT: 75 IN | TEMPERATURE: 97.8 F | HEART RATE: 70 BPM | DIASTOLIC BLOOD PRESSURE: 78 MMHG | RESPIRATION RATE: 16 BRPM | OXYGEN SATURATION: 98 % | BODY MASS INDEX: 30.09 KG/M2 | WEIGHT: 242 LBS | SYSTOLIC BLOOD PRESSURE: 135 MMHG

## 2022-05-27 DIAGNOSIS — I70.209 FEMORAL ARTERY OCCLUSION (HCC): Primary | ICD-10-CM

## 2022-05-27 DIAGNOSIS — I99.8 ISCHEMIA OF RIGHT LOWER EXTREMITY: ICD-10-CM

## 2022-05-27 LAB
ALBUMIN SERPL-MCNC: 3.4 G/DL (ref 3.5–5)
ALBUMIN SERPL-MCNC: 3.6 G/DL (ref 3.5–5)
ALBUMIN/GLOB SERPL: 0.8 {RATIO} (ref 1.1–2.2)
ALBUMIN/GLOB SERPL: 1.1 {RATIO} (ref 1.1–2.2)
ALP SERPL-CCNC: 118 U/L (ref 45–117)
ALP SERPL-CCNC: 145 U/L (ref 45–117)
ALT SERPL-CCNC: 105 U/L (ref 12–78)
ALT SERPL-CCNC: 20 U/L (ref 12–78)
ANION GAP SERPL CALC-SCNC: 4 MMOL/L (ref 5–15)
ANION GAP SERPL CALC-SCNC: 5 MMOL/L (ref 5–15)
APTT PPP: 26.8 SEC (ref 21.2–34.1)
APTT PPP: 93.9 SEC (ref 22.1–31)
AST SERPL W P-5'-P-CCNC: 15 U/L (ref 15–37)
AST SERPL-CCNC: 130 U/L (ref 15–37)
BASOPHILS # BLD: 0.2 K/UL (ref 0–0.1)
BASOPHILS # BLD: 0.3 K/UL (ref 0–0.1)
BASOPHILS NFR BLD: 1 % (ref 0–1)
BASOPHILS NFR BLD: 2 % (ref 0–1)
BILIRUB SERPL-MCNC: 0.5 MG/DL (ref 0.2–1)
BILIRUB SERPL-MCNC: 0.9 MG/DL (ref 0.2–1)
BUN SERPL-MCNC: 11 MG/DL (ref 6–20)
BUN SERPL-MCNC: 12 MG/DL (ref 6–20)
BUN/CREAT SERPL: 12 (ref 12–20)
BUN/CREAT SERPL: 12 (ref 12–20)
CA-I BLD-MCNC: 9 MG/DL (ref 8.5–10.1)
CALCIUM SERPL-MCNC: 8.9 MG/DL (ref 8.5–10.1)
CHLORIDE SERPL-SCNC: 105 MMOL/L (ref 97–108)
CHLORIDE SERPL-SCNC: 106 MMOL/L (ref 97–108)
CK SERPL-CCNC: 100 U/L (ref 39–308)
CO2 SERPL-SCNC: 26 MMOL/L (ref 21–32)
CO2 SERPL-SCNC: 28 MMOL/L (ref 21–32)
CREAT SERPL-MCNC: 0.94 MG/DL (ref 0.7–1.3)
CREAT SERPL-MCNC: 1.01 MG/DL (ref 0.7–1.3)
DIFFERENTIAL METHOD BLD: ABNORMAL
DIFFERENTIAL METHOD BLD: ABNORMAL
EOSINOPHIL # BLD: 0 K/UL (ref 0–0.4)
EOSINOPHIL # BLD: 0.2 K/UL (ref 0–0.4)
EOSINOPHIL NFR BLD: 0 % (ref 0–7)
EOSINOPHIL NFR BLD: 1 % (ref 0–7)
ERYTHROCYTE [DISTWIDTH] IN BLOOD BY AUTOMATED COUNT: 14 % (ref 11.5–14.5)
ERYTHROCYTE [DISTWIDTH] IN BLOOD BY AUTOMATED COUNT: 14 % (ref 11.5–14.5)
GLOBULIN SER CALC-MCNC: 3.3 G/DL (ref 2–4)
GLOBULIN SER CALC-MCNC: 4.2 G/DL (ref 2–4)
GLUCOSE SERPL-MCNC: 85 MG/DL (ref 65–100)
GLUCOSE SERPL-MCNC: 95 MG/DL (ref 65–100)
HCT VFR BLD AUTO: 49.6 % (ref 36.6–50.3)
HCT VFR BLD AUTO: 51.1 % (ref 36.6–50.3)
HGB BLD-MCNC: 17.1 G/DL (ref 12.1–17)
HGB BLD-MCNC: 17.4 G/DL (ref 12.1–17)
IMM GRANULOCYTES # BLD AUTO: 0 K/UL
IMM GRANULOCYTES # BLD AUTO: 0 K/UL
IMM GRANULOCYTES NFR BLD AUTO: 0 %
IMM GRANULOCYTES NFR BLD AUTO: 0 %
INR PPP: 0.9 (ref 0.9–1.1)
LACTATE SERPL-SCNC: 0.6 MMOL/L (ref 0.4–2)
LYMPHOCYTES # BLD: 5 K/UL (ref 0.8–3.5)
LYMPHOCYTES # BLD: 6.6 K/UL (ref 0.8–3.5)
LYMPHOCYTES NFR BLD: 33 % (ref 12–49)
LYMPHOCYTES NFR BLD: 51 % (ref 12–49)
MCH RBC QN AUTO: 31.5 PG (ref 26–34)
MCH RBC QN AUTO: 31.6 PG (ref 26–34)
MCHC RBC AUTO-ENTMCNC: 34.1 G/DL (ref 30–36.5)
MCHC RBC AUTO-ENTMCNC: 34.5 G/DL (ref 30–36.5)
MCV RBC AUTO: 91.5 FL (ref 80–99)
MCV RBC AUTO: 92.9 FL (ref 80–99)
MONOCYTES # BLD: 0.8 K/UL (ref 0–1)
MONOCYTES # BLD: 1.2 K/UL (ref 0–1)
MONOCYTES NFR BLD: 5 % (ref 5–13)
MONOCYTES NFR BLD: 9 % (ref 5–13)
NEUTS BAND NFR BLD MANUAL: 1 % (ref 0–6)
NEUTS SEG # BLD: 4.9 K/UL (ref 1.8–8)
NEUTS SEG # BLD: 8.8 K/UL (ref 1.8–8)
NEUTS SEG NFR BLD: 37 % (ref 32–75)
NEUTS SEG NFR BLD: 60 % (ref 32–75)
NRBC BLD-RTO: 0 PER 100 WBC
PLATELET # BLD AUTO: 278 K/UL (ref 150–400)
PLATELET # BLD AUTO: 284 K/UL (ref 150–400)
PMV BLD AUTO: 10.6 FL (ref 8.9–12.9)
PMV BLD AUTO: 10.7 FL (ref 8.9–12.9)
POTASSIUM SERPL-SCNC: 3.8 MMOL/L (ref 3.5–5.1)
POTASSIUM SERPL-SCNC: 4.3 MMOL/L (ref 3.5–5.1)
PROT SERPL-MCNC: 6.9 G/DL (ref 6.4–8.2)
PROT SERPL-MCNC: 7.6 G/DL (ref 6.4–8.2)
PROTHROMBIN TIME: 12.6 SEC (ref 11.9–14.6)
RBC # BLD AUTO: 5.42 M/UL (ref 4.1–5.7)
RBC # BLD AUTO: 5.5 M/UL (ref 4.1–5.7)
RBC MORPH BLD: ABNORMAL
RBC MORPH BLD: ABNORMAL
SODIUM SERPL-SCNC: 136 MMOL/L (ref 136–145)
SODIUM SERPL-SCNC: 138 MMOL/L (ref 136–145)
THERAPEUTIC RANGE,PTTT: ABNORMAL SECS (ref 58–77)
THERAPEUTIC RANGE,PTTT: NORMAL SEC (ref 82–109)
WBC # BLD AUTO: 13 K/UL (ref 4.1–11.1)
WBC # BLD AUTO: 15 K/UL (ref 4.1–11.1)
WBC MORPH BLD: ABNORMAL

## 2022-05-27 PROCEDURE — 83605 ASSAY OF LACTIC ACID: CPT

## 2022-05-27 PROCEDURE — 74011250637 HC RX REV CODE- 250/637: Performed by: EMERGENCY MEDICINE

## 2022-05-27 PROCEDURE — 36415 COLL VENOUS BLD VENIPUNCTURE: CPT

## 2022-05-27 PROCEDURE — 74011000636 HC RX REV CODE- 636: Performed by: RADIOLOGY

## 2022-05-27 PROCEDURE — 74011250636 HC RX REV CODE- 250/636: Performed by: STUDENT IN AN ORGANIZED HEALTH CARE EDUCATION/TRAINING PROGRAM

## 2022-05-27 PROCEDURE — 73630 X-RAY EXAM OF FOOT: CPT

## 2022-05-27 PROCEDURE — 85730 THROMBOPLASTIN TIME PARTIAL: CPT

## 2022-05-27 PROCEDURE — 82550 ASSAY OF CK (CPK): CPT

## 2022-05-27 PROCEDURE — 75635 CT ANGIO ABDOMINAL ARTERIES: CPT

## 2022-05-27 PROCEDURE — 80053 COMPREHEN METABOLIC PANEL: CPT

## 2022-05-27 PROCEDURE — 93005 ELECTROCARDIOGRAM TRACING: CPT

## 2022-05-27 PROCEDURE — 85610 PROTHROMBIN TIME: CPT

## 2022-05-27 PROCEDURE — 74011250636 HC RX REV CODE- 250/636: Performed by: EMERGENCY MEDICINE

## 2022-05-27 PROCEDURE — 96374 THER/PROPH/DIAG INJ IV PUSH: CPT

## 2022-05-27 PROCEDURE — G0378 HOSPITAL OBSERVATION PER HR: HCPCS

## 2022-05-27 PROCEDURE — 71275 CT ANGIOGRAPHY CHEST: CPT

## 2022-05-27 PROCEDURE — 99285 EMERGENCY DEPT VISIT HI MDM: CPT

## 2022-05-27 PROCEDURE — 85025 COMPLETE CBC W/AUTO DIFF WBC: CPT

## 2022-05-27 PROCEDURE — 74011000250 HC RX REV CODE- 250: Performed by: STUDENT IN AN ORGANIZED HEALTH CARE EDUCATION/TRAINING PROGRAM

## 2022-05-27 PROCEDURE — 96365 THER/PROPH/DIAG IV INF INIT: CPT

## 2022-05-27 PROCEDURE — 96375 TX/PRO/DX INJ NEW DRUG ADDON: CPT

## 2022-05-27 PROCEDURE — 93926 LOWER EXTREMITY STUDY: CPT

## 2022-05-27 PROCEDURE — 96366 THER/PROPH/DIAG IV INF ADDON: CPT

## 2022-05-27 RX ORDER — MORPHINE SULFATE 4 MG/ML
4 INJECTION INTRAVENOUS ONCE
Status: COMPLETED | OUTPATIENT
Start: 2022-05-27 | End: 2022-05-27

## 2022-05-27 RX ORDER — HEPARIN SODIUM 10000 [USP'U]/100ML
15-36 INJECTION, SOLUTION INTRAVENOUS
Status: DISCONTINUED | OUTPATIENT
Start: 2022-05-27 | End: 2022-05-27 | Stop reason: HOSPADM

## 2022-05-27 RX ORDER — ONDANSETRON 2 MG/ML
4 INJECTION INTRAMUSCULAR; INTRAVENOUS
Status: DISCONTINUED | OUTPATIENT
Start: 2022-05-27 | End: 2022-05-29 | Stop reason: HOSPADM

## 2022-05-27 RX ORDER — SODIUM CHLORIDE 9 MG/ML
150 INJECTION, SOLUTION INTRAVENOUS CONTINUOUS
Status: DISCONTINUED | OUTPATIENT
Start: 2022-05-27 | End: 2022-05-29 | Stop reason: HOSPADM

## 2022-05-27 RX ORDER — SODIUM CHLORIDE 0.9 % (FLUSH) 0.9 %
5-40 SYRINGE (ML) INJECTION EVERY 8 HOURS
Status: DISCONTINUED | OUTPATIENT
Start: 2022-05-27 | End: 2022-05-29 | Stop reason: HOSPADM

## 2022-05-27 RX ORDER — HEPARIN SODIUM 1000 [USP'U]/ML
80 INJECTION, SOLUTION INTRAVENOUS; SUBCUTANEOUS AS NEEDED
Status: DISCONTINUED | OUTPATIENT
Start: 2022-05-27 | End: 2022-05-27 | Stop reason: HOSPADM

## 2022-05-27 RX ORDER — MORPHINE SULFATE 4 MG/ML
4 INJECTION INTRAVENOUS
Status: COMPLETED | OUTPATIENT
Start: 2022-05-27 | End: 2022-05-27

## 2022-05-27 RX ORDER — ACETAMINOPHEN 325 MG/1
650 TABLET ORAL
Status: DISCONTINUED | OUTPATIENT
Start: 2022-05-27 | End: 2022-05-29 | Stop reason: HOSPADM

## 2022-05-27 RX ORDER — ONDANSETRON 4 MG/1
4 TABLET, ORALLY DISINTEGRATING ORAL
Status: DISCONTINUED | OUTPATIENT
Start: 2022-05-27 | End: 2022-05-29 | Stop reason: HOSPADM

## 2022-05-27 RX ORDER — HEPARIN SODIUM 1000 [USP'U]/ML
80 INJECTION, SOLUTION INTRAVENOUS; SUBCUTANEOUS ONCE
Status: COMPLETED | OUTPATIENT
Start: 2022-05-27 | End: 2022-05-27

## 2022-05-27 RX ORDER — SODIUM CHLORIDE 0.9 % (FLUSH) 0.9 %
5-40 SYRINGE (ML) INJECTION AS NEEDED
Status: DISCONTINUED | OUTPATIENT
Start: 2022-05-27 | End: 2022-05-29 | Stop reason: HOSPADM

## 2022-05-27 RX ORDER — ACETAMINOPHEN 650 MG/1
650 SUPPOSITORY RECTAL
Status: DISCONTINUED | OUTPATIENT
Start: 2022-05-27 | End: 2022-05-29 | Stop reason: HOSPADM

## 2022-05-27 RX ORDER — POLYETHYLENE GLYCOL 3350 17 G/17G
17 POWDER, FOR SOLUTION ORAL DAILY PRN
Status: DISCONTINUED | OUTPATIENT
Start: 2022-05-27 | End: 2022-05-29 | Stop reason: HOSPADM

## 2022-05-27 RX ORDER — HEPARIN SODIUM 1000 [USP'U]/ML
40 INJECTION, SOLUTION INTRAVENOUS; SUBCUTANEOUS AS NEEDED
Status: DISCONTINUED | OUTPATIENT
Start: 2022-05-27 | End: 2022-05-27 | Stop reason: HOSPADM

## 2022-05-27 RX ORDER — OXYCODONE AND ACETAMINOPHEN 5; 325 MG/1; MG/1
2 TABLET ORAL
Status: COMPLETED | OUTPATIENT
Start: 2022-05-27 | End: 2022-05-27

## 2022-05-27 RX ADMIN — MORPHINE SULFATE 4 MG: 4 INJECTION, SOLUTION INTRAMUSCULAR; INTRAVENOUS at 19:33

## 2022-05-27 RX ADMIN — Medication 10 ML: at 22:40

## 2022-05-27 RX ADMIN — NITROGLYCERIN 1 INCH: 20 OINTMENT TOPICAL at 14:33

## 2022-05-27 RX ADMIN — Medication 13 UNITS/KG/HR: at 19:20

## 2022-05-27 RX ADMIN — OXYCODONE AND ACETAMINOPHEN 2 TABLET: 5; 325 TABLET ORAL at 13:41

## 2022-05-27 RX ADMIN — IOPAMIDOL 97 ML: 755 INJECTION, SOLUTION INTRAVENOUS at 22:02

## 2022-05-27 RX ADMIN — HEPARIN SODIUM 15 UNITS/KG/HR: 10000 INJECTION, SOLUTION INTRAVENOUS at 17:01

## 2022-05-27 RX ADMIN — SODIUM CHLORIDE 150 ML/HR: 9 INJECTION, SOLUTION INTRAVENOUS at 23:30

## 2022-05-27 RX ADMIN — MORPHINE SULFATE 4 MG: 4 INJECTION, SOLUTION INTRAMUSCULAR; INTRAVENOUS at 15:56

## 2022-05-27 RX ADMIN — IOPAMIDOL 76 ML: 755 INJECTION, SOLUTION INTRAVENOUS at 22:02

## 2022-05-27 RX ADMIN — HEPARIN SODIUM 8780 UNITS: 1000 INJECTION, SOLUTION INTRAVENOUS; SUBCUTANEOUS at 16:57

## 2022-05-27 NOTE — ED PROVIDER NOTES
EMERGENCY DEPARTMENT HISTORY AND PHYSICAL EXAM      Date: 5/27/2022  Patient Name: Beth Keyes    History of Presenting Illness     Chief Complaint   Patient presents with    Foot Pain       History Provided By: Patient    HPI: Beth Keyes, 74 y.o. male with a past medical history significant No significant past medical history presents to the ED with cc of right fifth toe pain. Patient says been going on for couple of weeks. He was treated by his PCP for gout and then started on antibiotics 2 days ago but appears is getting worse and now its purple. Pain is made worse with movement relieved with nothing. There are no other complaints, changes, or physical findings at this time. PCP: Deondre Epstein., NP    No current facility-administered medications on file prior to encounter. Current Outpatient Medications on File Prior to Encounter   Medication Sig Dispense Refill    acetaminophen (TYLENOL EXTRA STRENGTH) 500 mg tablet Take 500 mg by mouth every six (6) hours as needed for Pain. Past History     Past Medical History:  Past Medical History:   Diagnosis Date    Arthritis     JRA age 7    Chronic pain     PUD (peptic ulcer disease)        Past Surgical History:  Past Surgical History:   Procedure Laterality Date    HX ORTHOPAEDIC Bilateral 1977    reconstruction of both elbows and wrists  with debridement X3    HX ORTHOPAEDIC Right 1977    fractured femur    HX ORTHOPAEDIC Right 2005    repair of fractured patella    HX OTHER SURGICAL  1977    splenectomy, lacerated pancreas    HX TONSILLECTOMY      [place] ABDOMEN SURGERY PROC UNLISTED      cholecystectomy    IA ABDOMEN SURGERY PROC UNLISTED      lysis of adhesions X3       Family History:  History reviewed. No pertinent family history.     Social History:  Social History     Tobacco Use    Smoking status: Current Every Day Smoker     Packs/day: 1.00     Years: 45.00     Pack years: 45.00    Smokeless tobacco: Never Used   Substance Use Topics    Alcohol use: No    Drug use: No       Allergies:  No Known Allergies      Review of Systems     Review of Systems   Constitutional: Negative. Negative for appetite change, chills, fatigue and fever. HENT: Negative. Negative for congestion and sinus pain. Eyes: Negative. Negative for pain and visual disturbance. Respiratory: Negative. Negative for chest tightness and shortness of breath. Cardiovascular: Negative. Negative for chest pain. Gastrointestinal: Negative. Negative for abdominal pain, diarrhea, nausea and vomiting. Genitourinary: Negative. Negative for difficulty urinating. No discharge   Musculoskeletal: Negative. Negative for arthralgias. Skin: Positive for wound. Negative for rash. Neurological: Negative. Negative for weakness and headaches. Hematological: Negative. Psychiatric/Behavioral: Negative. Negative for agitation. The patient is not nervous/anxious. All other systems reviewed and are negative. Physical Exam     Physical Exam  Vitals and nursing note reviewed. Constitutional:       General: He is not in acute distress. Appearance: He is well-developed. HENT:      Head: Normocephalic and atraumatic. Nose: Nose normal.      Mouth/Throat:      Mouth: Mucous membranes are moist.      Pharynx: Oropharynx is clear. No oropharyngeal exudate. Eyes:      General:         Right eye: No discharge. Left eye: No discharge. Conjunctiva/sclera: Conjunctivae normal.      Pupils: Pupils are equal, round, and reactive to light. Cardiovascular:      Rate and Rhythm: Normal rate and regular rhythm. Chest Wall: PMI is not displaced. No thrill. Heart sounds: Normal heart sounds. No murmur heard. No friction rub. No gallop. Pulmonary:      Effort: Pulmonary effort is normal. No respiratory distress. Breath sounds: Normal breath sounds. No wheezing or rales.    Chest:      Chest wall: No tenderness. Abdominal:      General: Bowel sounds are normal. There is no distension. Palpations: Abdomen is soft. There is no mass. Tenderness: There is no abdominal tenderness. There is no guarding or rebound. Musculoskeletal:         General: Normal range of motion. Cervical back: Normal range of motion and neck supple. Comments: Fifth toe on right foot is cyanotic and tender to palpation. There are elements of cyanosis to the distal aspects of a few of the other toes on both feet. DP and PT pulses intact cap refill less than 2 seconds. Lymphadenopathy:      Cervical: No cervical adenopathy. Skin:     General: Skin is warm and dry. Capillary Refill: Capillary refill takes less than 2 seconds. Findings: No erythema or rash. Neurological:      Mental Status: He is alert and oriented to person, place, and time. Cranial Nerves: No cranial nerve deficit. Coordination: Coordination normal.   Psychiatric:         Mood and Affect: Mood normal.         Behavior: Behavior normal.         Lab and Diagnostic Study Results     Labs -     Recent Results (from the past 12 hour(s))   PTT    Collection Time: 05/28/22 10:44 PM   Result Value Ref Range    aPTT 35.0 (H) 22.1 - 31.0 sec    aPTT, therapeutic range     58.0 - 77.0 SECS   CBC WITH AUTOMATED DIFF    Collection Time: 05/29/22  3:50 AM   Result Value Ref Range    WBC 15.4 (H) 4.1 - 11.1 K/uL    RBC 4.55 4.10 - 5.70 M/uL    HGB 14.2 12.1 - 17.0 g/dL    HCT 42.0 36.6 - 50.3 %    MCV 92.3 80.0 - 99.0 FL    MCH 31.2 26.0 - 34.0 PG    MCHC 33.8 30.0 - 36.5 g/dL    RDW 14.2 11.5 - 14.5 %    PLATELET 419 872 - 177 K/uL    MPV 10.8 8.9 - 12.9 FL    NRBC 0.0 0  WBC    ABSOLUTE NRBC 0.00 0.00 - 0.01 K/uL    NEUTROPHILS 61 32 - 75 %    LYMPHOCYTES 29 12 - 49 %    MONOCYTES 9 5 - 13 %    EOSINOPHILS 0 0 - 7 %    BASOPHILS 0 0 - 1 %    IMMATURE GRANULOCYTES 0 0.0 - 0.5 %    ABS. NEUTROPHILS 9.4 (H) 1.8 - 8.0 K/UL    ABS. LYMPHOCYTES 4.4 (H) 0.8 - 3.5 K/UL    ABS. MONOCYTES 1.5 (H) 0.0 - 1.0 K/UL    ABS. EOSINOPHILS 0.1 0.0 - 0.4 K/UL    ABS. BASOPHILS 0.0 0.0 - 0.1 K/UL    ABS. IMM. GRANS. 0.1 (H) 0.00 - 0.04 K/UL    DF AUTOMATED     PTT    Collection Time: 05/29/22  3:50 AM   Result Value Ref Range    aPTT >130.0 (HH) 22.1 - 31.0 sec    aPTT, therapeutic range     58.0 - 16.4 SECS   METABOLIC PANEL, BASIC    Collection Time: 05/29/22  3:50 AM   Result Value Ref Range    Sodium 139 136 - 145 mmol/L    Potassium 4.1 3.5 - 5.1 mmol/L    Chloride 111 (H) 97 - 108 mmol/L    CO2 25 21 - 32 mmol/L    Anion gap 3 (L) 5 - 15 mmol/L    Glucose 111 (H) 65 - 100 mg/dL    BUN 7 6 - 20 MG/DL    Creatinine 0.75 0.70 - 1.30 MG/DL    BUN/Creatinine ratio 9 (L) 12 - 20      GFR est AA >60 >60 ml/min/1.73m2    GFR est non-AA >60 >60 ml/min/1.73m2    Calcium 7.4 (L) 8.5 - 10.1 MG/DL       Radiologic Studies -   @lastxrresult@  CT Results  (Last 48 hours)               05/27/22 2201  CTA ABD ART W RUNOFF W WO CONT Final result    Impression:  1. Long segment thrombosis (~12 cm) in the right, mid and distal, SFA. 2. Thrombus is not completely occlusive: the distal SFA and popliteal arteries   are opacified. 3. Pinpoint stenosis of the distal left SFA, subtotal occlusion as it crosses   Patricio's canal.   4. Stenosis of the right peroneal-PTA common trunk. 5. Mild circumferential wall thickening of the proximal esophagus. The findings were called to Dr. Evelyn Beebe on 5/27/2022 at 2241 hours by Dr. Santi Gilbert. [phone]       Narrative:  CT ABDOMEN, PELVIS, AND BILATERAL LOWER EXTREMITY ANGIOGRAPHY. 5/27/2022 10:01   PM        INDICATION: Right fifth toe ischemia. Superficial femoral artery (SFA) filling   defect on outside arterial Doppler. Physical examination discordant with SFA   thrombus. COMPARISON: None.        TECHNIQUE: CT of the abdomen, pelvis, and bilateral lower extremities was   performed after the administration of 76 cc IV contrast (Isovue 370). Coronal   MIP reconstructions were performed. CT dose reduction was achieved through use   of a standardized protocol tailored for this examination and automatic exposure   control for dose modulation. FINDINGS:   Contrast bolus timing of arteriography is delayed, and volume is low, limiting   the study somewhat. Vascular, abdomen: The abdominal aorta is normal caliber. The celiac, superior   mesenteric, bilateral renal, and inferior mesenteric arteries, and their   proximal branches, are widely patent. The splenic artery is diminutive secondary   to splenectomy. Vascular, right lower extremity: No stenosis of the common iliac, external   iliac, or common femoral arteries. Long segment thrombosis (118 mm, see double   coronal oblique annotated image 14-1) in the mid and distal SFA is likely acute,   as the vessel is expanded, and the ends of the thrombus are convex   outward/sausage-shaped (602-69, 602-62). Contrast extends around the outside of   the thrombus. No central recanalization and peripheralization of thrombus to   suggest chronicity. Thrombus is not completely occlusive, however, as the distal   SFA and popliteal arteries are opacified. There is multifocal stenosis of the   common trunk of the peroneal and posterior tibial arteries (PTA), more severe   distally (3-777). There is three-vessel runoff to the level of the ankle,   however. The dorsalis pedis is visible to at least the proximal metatarsals. The   PTA is visible to at least the distal metatarsals. There is subcutaneous edema   and plantar original edema in the heel. There is an old, healed, fracture of the   proximal third femoral diaphysis. Vascular, left lower extremity: No stenosis of the common iliac, external iliac,   or common femoral arteries. There is pinpoint stenosis of the distal SFA   (3-564), and subtotal occlusion as it crosses Patricio's canal (3-579). No   popliteal artery stenosis. There is three-vessel runoff to the level of the   ankle. The dorsalis pedis is visible to at least the mid foot and the PTA is   visible to the mid to distal metatarsals. There is dorsal forefoot edema and   heel edema. Abdomen: Mild circumferential wall thickening in the proximal esophagus was   better seen on chest CT (3-32 on accession number [id number]). A small left renal   cyst requires no follow-up. Post cholecystectomy. Incidental note is made of a   tiny hepatic cysts. The stomach, duodenum, liver, pancreas, adrenals, and   kidneys are otherwise normal. Post splenectomy. Pelvis: Descending diverticulosis is mild. The small bowel, ileocecal junction,   colon, and bladder are otherwise normal. The appendix is not visualized; no   pericecal inflammatory process. No free air or fluid, and no abdominopelvic   lymphadenopathy. 05/27/22 2201  CTA 1144 [facility] CONT Final result    Impression:  1. No overt systemic embolic source. 2. Minimal emphysema. 3. Endobronchial debris/mucus in the right mainstem and right lower lobar   bronchus. Narrative:  CT ANGIOGRAPHY CHEST. 5/27/2022 10:01 PM        INDICATION: Evaluate from [facility] source. History of trauma. COMPARISON: None. TECHNIQUE: CT angiography of the chest was performed after the administration of   97 cc IV contrast (Isovue 370). Coronal and sagittal, and coronal MIP   reconstructions were performed. CT dose reduction was achieved through use of a   standardized protocol tailored for this examination and automatic exposure   control for dose modulation. FINDINGS:   Vascular: There is no overt central systemic embolic source. No clear thrombus   in the left atrium, left ventricle, or thoracic aorta. Evaluation of the left   atrial appendage is limited by motion, though it has contrast within it. The heart is mildly enlarged. Left anterior descending coronary calcifications   are mild.  The thoracic aorta is normal. The arch vessels are normal.       Chest: Centrilobular emphysema is minimal. There is nonobstructing endobronchial   debris in the right mainstem bronchus and right lower lobe bronchus. The central   airways are patent. No pneumothorax or pleural effusion. Incidental note is made   of accessory fissures in the right lower lobe. There are several healing right   anterior rib fractures. A T7 compression fracture (622-15) is age indeterminate. No secondary findings to suggest that it is acute. CXR Results  (Last 48 hours)    None            Medical Decision Making   - I am the first provider for this patient. - I reviewed the vital signs, available nursing notes, past medical history, past surgical history, family history and social history. - Initial assessment performed. The patients presenting problems have been discussed, and they are in agreement with the care plan formulated and outlined with them. I have encouraged them to ask questions as they arise throughout their visit. Vital Signs-Reviewed the patient's vital signs. No data found. Will discuss with podiatry and will get x-ray assess for osteo-    ED Course:     ED Course as of 05/29/22 0701   Fri May 27, 2022   1351 Discussed the case with the podiatrist who recommends Nitropaste just proximal to the foot as well as admission to the hospital after labs and x-rays. [CS]   8095 Patient has a superficial thrombus to the right femoral.  Heparin drip [CS]   1520 Discussed the case with Dr. Kade Posada who recommends calling vascular surgery [CS]   1987 Discussed the case with vascular surgery who conveys that the patient should be shipped out to a vascular capable facility [CS]   [phone] To the transfer center to transfer the patient to [facility] in [place]. Awaiting a callback now.  [CS]   8356 Every call the transfer center and asked them to be page [facility] hospitalist [CS]   5928 I discussed the case with Dr. Jason Cowan at [facility] and he is recommending the patient go to [facility] under the care of Dr. Tara Arce. They will evaluate him there [CS]      ED Course User Index  [CS] Smith Corona MD       Provider Notes (Medical Decision Making): MDM       Procedures   Medical Decision Makingedical Decision Making  Performed by: Nica Hernandez MD  PROCEDURES:  Procedures       Disposition   Disposition: Condition stable    Transferred to Another Facility    DISCHARGE PLAN:  1. Current Discharge Medication List      CONTINUE these medications which have NOT CHANGED    Details   acetaminophen (TYLENOL EXTRA STRENGTH) 500 mg tablet Take 500 mg by mouth every six (6) hours as needed for Pain. 2.   Follow-up Information    None       3. Return to ED if worse   4. Discharge Medication List as of 5/27/2022  5:37 PM            Diagnosis     Clinical Impression:   1. Femoral artery occlusion [facility])        Attestations:    Nica Hernandez MD    Please note that this dictation was completed with [employer], the computer voice recognition software. Quite often unanticipated grammatical, syntax, homophones, and other interpretive errors are inadvertently transcribed by the computer software. Please disregard these errors. Please excuse any errors that have escaped final proofreading. Thank you.

## 2022-05-27 NOTE — ED TRIAGE NOTES
Patient is transfer in with chief complain arterial occlusion. Pt has cold/blueish/black right pinky toe. Pt states that he has burning pain in his right pinky toe that has been going on for 3 weeks.

## 2022-05-27 NOTE — ED PROVIDER NOTES
57-year-old male presents with a chief complaint of an arterial occlusion. Patient was seen in the emergency department outside side today and found to have an occlusion of his superficial right femoral artery. Patient states that he has had pain in his right fifth toe for several weeks. His PCP initially treated him for gout and with antibiotics but it appeared to be getting worse and is now purple. Past Medical History:   Diagnosis Date    Arthritis     JRA age 7    Chronic pain     PUD (peptic ulcer disease)        Past Surgical History:   Procedure Laterality Date    HX ORTHOPAEDIC Bilateral 1977    reconstruction of both elbows and wrists  with debridement X3    HX ORTHOPAEDIC Right 1977    fractured femur    HX ORTHOPAEDIC Right 2005    repair of fractured patella    HX OTHER SURGICAL  1977    splenectomy, lacerated pancreas    HX TONSILLECTOMY      [place] ABDOMEN SURGERY PROC UNLISTED      cholecystectomy    NC ABDOMEN SURGERY PROC UNLISTED      lysis of adhesions X3         No family history on file.     Social History     Socioeconomic History    Marital status:      Spouse name: Not on file    Number of children: Not on file    Years of education: Not on file    Highest education level: Not on file   Occupational History    Not on file   Tobacco Use    Smoking status: Current Every Day Smoker     Packs/day: 1.00     Years: 45.00     Pack years: 45.00    Smokeless tobacco: Never Used   Substance and Sexual Activity    Alcohol use: No    Drug use: No    Sexual activity: Not on file   Other Topics Concern    Not on file   Social History Narrative    Not on file     Social Determinants of Health     Financial Resource Strain:     Difficulty of Paying Living Expenses: Not on file   Food Insecurity:     Worried About Running Out of Food in the Last Year: Not on file    Giuseppe of Food in the Last Year: Not on file   Transportation Needs:     Lack of Transportation (Medical): Not on file    Lack of Transportation (Non-Medical): Not on file   Physical Activity:     Days of Exercise per Week: Not on file    Minutes of Exercise per Session: Not on file   Stress:     Feeling of Stress : Not on file   Social Connections:     Frequency of Communication with Friends and Family: Not on file    Frequency of Social Gatherings with Friends and Family: Not on file    Attends [employer] Services: Not on file    Active Member of [address] [place] [address] [employer] or Organizations: Not on file    Attends Club or Organization Meetings: Not on file    Marital Status: Not on file   Intimate Partner Violence:     Fear of Current or Ex-Partner: Not on file    Emotionally Abused: Not on file    Physically Abused: Not on file    Sexually Abused: Not on file   Housing Stability:     Unable to Pay for Housing in the Last Year: Not on file    Number of [place] in the Last Year: Not on file    Unstable Housing in the Last Year: Not on file         ALLERGIES: Patient has no known allergies. Review of Systems   Constitutional: Negative for fever. HENT: Negative for rhinorrhea. Respiratory: Negative for cough. Cardiovascular: Negative for chest pain. Gastrointestinal: Negative for abdominal pain. Genitourinary: Negative for dysuria. Musculoskeletal: Negative for back pain. Skin: Positive for color change. Negative for wound. Neurological: Negative for headaches. Psychiatric/Behavioral: Negative for confusion. There were no vitals filed for this visit. Physical Exam  Vitals and nursing note reviewed. Constitutional:       General: He is not in acute distress. Appearance: Normal appearance. He is not ill-appearing, toxic-appearing or diaphoretic. HENT:      Head: Normocephalic. Eyes:      Extraocular Movements: Extraocular movements intact. Cardiovascular:      Rate and Rhythm: Normal rate. Rhythm irregular. Pulses: Normal pulses.       Comments: Palpable right pedal pulse. Pulmonary:      Effort: Pulmonary effort is normal. No respiratory distress. Abdominal:      General: There is no distension. Musculoskeletal:         General: Normal range of motion. Cervical back: Normal range of motion. Skin:     General: Skin is dry. Capillary Refill: Capillary refill takes less than 2 seconds. Comments: Discoloration of the right small toe. See picture below. Neurological:      Mental Status: He is alert and oriented to person, place, and time. Psychiatric:         Mood and Affect: Mood normal.                    MDM  Number of Diagnoses or Management Options  Femoral artery occlusion [facility])  Diagnosis management comments:   Patient received a transfer for her superficial femoral artery occlusion. He arrives on a heparin drip. Vascular surgery consulted. Patient will be admitted to hospital medicine. Lactic acid and CK normal.  EKG shows sinus rhythm at a rate of 70, normal intervals, normal axis, no ischemic changes. Perfect Serve Consult for Admission  8:29 PM    ED Room Number: ER07/07  Patient Name and age:   Beth Keyes 74 y.o.  male  Working Diagnosis: Femoral artery occlusion ([address]  (primary encounter diagnosis)     COVID-19 Suspicion:  no  Sepsis present:  no  Reassessment needed: no  Code Status:  Full Code  Readmission: no  Isolation Requirements:  no  Recommended Level of Care:  telemetry  Department:[facility] Adult ED - 21   Other:  Vascular surg request medicine admission         Amount and/or Complexity of Data Reviewed  Clinical lab tests: ordered and reviewed  Tests in the radiology section of CPT®: ordered and reviewed           Procedures

## 2022-05-28 ENCOUNTER — ANESTHESIA (OUTPATIENT)
Dept: SURGERY | Age: 67
End: 2022-05-28
Payer: COMMERCIAL

## 2022-05-28 ENCOUNTER — ANESTHESIA EVENT (OUTPATIENT)
Dept: SURGERY | Age: 67
End: 2022-05-28
Payer: COMMERCIAL

## 2022-05-28 ENCOUNTER — APPOINTMENT (OUTPATIENT)
Dept: NON INVASIVE DIAGNOSTICS | Age: 67
End: 2022-05-28
Attending: SURGERY
Payer: COMMERCIAL

## 2022-05-28 LAB
ABO + RH BLD: NORMAL
APTT PPP: 35 SEC (ref 22.1–31)
APTT PPP: 35.3 SEC (ref 22.1–31)
APTT PPP: 38 SEC (ref 22.1–31)
ATRIAL RATE: 70 BPM
BLOOD GROUP ANTIBODIES SERPL: NORMAL
CALCULATED P AXIS, ECG09: 69 DEGREES
CALCULATED R AXIS, ECG10: 3 DEGREES
CALCULATED T AXIS, ECG11: 67 DEGREES
COMMENT, HOLDF: NORMAL
DIAGNOSIS, 93000: NORMAL
P-R INTERVAL, ECG05: 146 MS
Q-T INTERVAL, ECG07: 396 MS
QRS DURATION, ECG06: 96 MS
QTC CALCULATION (BEZET), ECG08: 427 MS
RIGHT ABI: 0.57
RIGHT ARM BP: 140 MMHG
RIGHT CFA DIST SYS PSV: 79.5 CM/S
RIGHT DIST ATA VELOCITY: 30.7 CM/S
RIGHT DIST PTA PSV: 36.5 CM/S
RIGHT POPLITEAL DIST SYS PSV: 25.8 CM/S
RIGHT POSTERIOR TIBIAL: 80 MMHG
RIGHT SFA PROX VEL RATIO: 0.3
RIGHT SUPER FEMORAL DIST SYS PSV: 28.3 CM/S
RIGHT SUPER FEMORAL PROX SYS PSV: 23.6 CM/S
SAMPLES BEING HELD,HOLD: NORMAL
SPECIMEN EXP DATE BLD: NORMAL
THERAPEUTIC RANGE,PTTT: ABNORMAL SECS (ref 58–77)
VENTRICULAR RATE, ECG03: 70 BPM

## 2022-05-28 PROCEDURE — 2709999900 HC NON-CHARGEABLE SUPPLY: Performed by: SURGERY

## 2022-05-28 PROCEDURE — 88304 TISSUE EXAM BY PATHOLOGIST: CPT

## 2022-05-28 PROCEDURE — 76210000016 HC OR PH I REC 1 TO 1.5 HR: Performed by: SURGERY

## 2022-05-28 PROCEDURE — 85730 THROMBOPLASTIN TIME PARTIAL: CPT

## 2022-05-28 PROCEDURE — 77030013079 HC BLNKT BAIR HGGR 3M -A: Performed by: ANESTHESIOLOGY

## 2022-05-28 PROCEDURE — 76010000131 HC OR TIME 2 TO 2.5 HR: Performed by: SURGERY

## 2022-05-28 PROCEDURE — 74011250637 HC RX REV CODE- 250/637: Performed by: SURGERY

## 2022-05-28 PROCEDURE — 74011250636 HC RX REV CODE- 250/636

## 2022-05-28 PROCEDURE — 74011250636 HC RX REV CODE- 250/636: Performed by: ANESTHESIOLOGY

## 2022-05-28 PROCEDURE — G0378 HOSPITAL OBSERVATION PER HR: HCPCS

## 2022-05-28 PROCEDURE — 77030002933 HC SUT MCRYL J&J -A: Performed by: SURGERY

## 2022-05-28 PROCEDURE — 77030008684 HC TU ET CUF COVD -B: Performed by: ANESTHESIOLOGY

## 2022-05-28 PROCEDURE — 94760 N-INVAS EAR/PLS OXIMETRY 1: CPT

## 2022-05-28 PROCEDURE — 74011250636 HC RX REV CODE- 250/636: Performed by: EMERGENCY MEDICINE

## 2022-05-28 PROCEDURE — 74011000250 HC RX REV CODE- 250: Performed by: STUDENT IN AN ORGANIZED HEALTH CARE EDUCATION/TRAINING PROGRAM

## 2022-05-28 PROCEDURE — 77030018673: Performed by: SURGERY

## 2022-05-28 PROCEDURE — 77030026438 HC STYL ET INTUB CARD -A: Performed by: ANESTHESIOLOGY

## 2022-05-28 PROCEDURE — 74011000250 HC RX REV CODE- 250: Performed by: SURGERY

## 2022-05-28 PROCEDURE — 74011250636 HC RX REV CODE- 250/636: Performed by: NURSE ANESTHETIST, CERTIFIED REGISTERED

## 2022-05-28 PROCEDURE — 93926 LOWER EXTREMITY STUDY: CPT | Performed by: SURGERY

## 2022-05-28 PROCEDURE — 77030010507 HC ADH SKN DERMBND J&J -B: Performed by: SURGERY

## 2022-05-28 PROCEDURE — 74011250636 HC RX REV CODE- 250/636: Performed by: SURGERY

## 2022-05-28 PROCEDURE — 74011250636 HC RX REV CODE- 250/636: Performed by: STUDENT IN AN ORGANIZED HEALTH CARE EDUCATION/TRAINING PROGRAM

## 2022-05-28 PROCEDURE — 77030002987 HC SUT PROL J&J -B: Performed by: SURGERY

## 2022-05-28 PROCEDURE — C1768 GRAFT, VASCULAR: HCPCS | Performed by: SURGERY

## 2022-05-28 PROCEDURE — 77030042556 HC PNCL CAUT -B: Performed by: SURGERY

## 2022-05-28 PROCEDURE — 77030031139 HC SUT VCRL2 J&J -A: Performed by: SURGERY

## 2022-05-28 PROCEDURE — 77030002986 HC SUT PROL J&J -A: Performed by: SURGERY

## 2022-05-28 PROCEDURE — 36415 COLL VENOUS BLD VENIPUNCTURE: CPT

## 2022-05-28 PROCEDURE — 76060000035 HC ANESTHESIA 2 TO 2.5 HR: Performed by: SURGERY

## 2022-05-28 PROCEDURE — 74011250637 HC RX REV CODE- 250/637: Performed by: NURSE PRACTITIONER

## 2022-05-28 PROCEDURE — 74011000250 HC RX REV CODE- 250: Performed by: NURSE ANESTHETIST, CERTIFIED REGISTERED

## 2022-05-28 PROCEDURE — 77030014008 HC SPNG HEMSTAT J&J -C: Performed by: SURGERY

## 2022-05-28 PROCEDURE — 86900 BLOOD TYPING SEROLOGIC ABO: CPT

## 2022-05-28 PROCEDURE — 74011000250 HC RX REV CODE- 250: Performed by: ANESTHESIOLOGY

## 2022-05-28 PROCEDURE — 77030014007 HC SPNG HEMSTAT J&J -B: Performed by: SURGERY

## 2022-05-28 PROCEDURE — 96366 THER/PROPH/DIAG IV INF ADDON: CPT

## 2022-05-28 PROCEDURE — 77010033678 HC OXYGEN DAILY

## 2022-05-28 DEVICE — VASCU-GUARD PERIPHERAL VASCULAR PATCH IS PREPARED FROM BOVINE PERICARDIUM WHICH IS CROSS-LINKED WITH GLUTARALDEHYDE. THE PERICARDIUM IS PROCURED FROM CATTLE ORIGINATING IN THE UNITED STATES. VASCU-GUARD PERIPHERAL VASCULAR PATCH IS CHEMICALLY STERILIZED USING ETHANOL AND PROPYLENE OXIDE. VASCU-GUARD PERIPHERAL VASCULAR PATCH HAS BEEN TREATED WITH 1 MOLAR SODIUM HYDROXIDE FOR A MINIMUM OF 60 MINUTES AT 20 - 25°C.  VASCU-GUARD PERIPHERAL VASCULAR PATCH IS PACKAGED IN A CONTAINER FILLED WITH STERILE, NON-PYROGENIC WATER CONTAINING PROPYLENE OXIDE. THE CONTENTS OF THE UNOPENED, UNDAMAGED CONTAINER ARE STERILE.
Type: IMPLANTABLE DEVICE | Site: GROIN | Status: FUNCTIONAL
Brand: VASCU-GUARD PERIPHERAL VASCULAR PATCH

## 2022-05-28 RX ORDER — SODIUM CHLORIDE, SODIUM LACTATE, POTASSIUM CHLORIDE, CALCIUM CHLORIDE 600; 310; 30; 20 MG/100ML; MG/100ML; MG/100ML; MG/100ML
75 INJECTION, SOLUTION INTRAVENOUS CONTINUOUS
Status: DISCONTINUED | OUTPATIENT
Start: 2022-05-28 | End: 2022-05-28 | Stop reason: HOSPADM

## 2022-05-28 RX ORDER — ROPIVACAINE HYDROCHLORIDE 5 MG/ML
30 INJECTION, SOLUTION EPIDURAL; INFILTRATION; PERINEURAL ONCE
Status: DISCONTINUED | OUTPATIENT
Start: 2022-05-28 | End: 2022-05-28 | Stop reason: HOSPADM

## 2022-05-28 RX ORDER — SODIUM CHLORIDE 0.9 % (FLUSH) 0.9 %
5-40 SYRINGE (ML) INJECTION EVERY 8 HOURS
Status: DISCONTINUED | OUTPATIENT
Start: 2022-05-28 | End: 2022-05-28 | Stop reason: HOSPADM

## 2022-05-28 RX ORDER — MIDAZOLAM HYDROCHLORIDE 1 MG/ML
INJECTION, SOLUTION INTRAMUSCULAR; INTRAVENOUS AS NEEDED
Status: DISCONTINUED | OUTPATIENT
Start: 2022-05-28 | End: 2022-05-28 | Stop reason: HOSPADM

## 2022-05-28 RX ORDER — SODIUM CHLORIDE 0.9 % (FLUSH) 0.9 %
5-40 SYRINGE (ML) INJECTION AS NEEDED
Status: DISCONTINUED | OUTPATIENT
Start: 2022-05-28 | End: 2022-05-28 | Stop reason: HOSPADM

## 2022-05-28 RX ORDER — DIPHENHYDRAMINE HYDROCHLORIDE 50 MG/ML
12.5 INJECTION, SOLUTION INTRAMUSCULAR; INTRAVENOUS AS NEEDED
Status: DISCONTINUED | OUTPATIENT
Start: 2022-05-28 | End: 2022-05-28 | Stop reason: HOSPADM

## 2022-05-28 RX ORDER — OXYCODONE HYDROCHLORIDE 5 MG/1
10 TABLET ORAL
Status: DISCONTINUED | OUTPATIENT
Start: 2022-05-28 | End: 2022-05-29 | Stop reason: HOSPADM

## 2022-05-28 RX ORDER — HYDROMORPHONE HYDROCHLORIDE 1 MG/ML
0.2 INJECTION, SOLUTION INTRAMUSCULAR; INTRAVENOUS; SUBCUTANEOUS
Status: DISCONTINUED | OUTPATIENT
Start: 2022-05-28 | End: 2022-05-28 | Stop reason: HOSPADM

## 2022-05-28 RX ORDER — MORPHINE SULFATE 2 MG/ML
2 INJECTION, SOLUTION INTRAMUSCULAR; INTRAVENOUS
Status: DISCONTINUED | OUTPATIENT
Start: 2022-05-28 | End: 2022-05-28 | Stop reason: HOSPADM

## 2022-05-28 RX ORDER — SODIUM CHLORIDE 9 MG/ML
25 INJECTION, SOLUTION INTRAVENOUS CONTINUOUS
Status: DISCONTINUED | OUTPATIENT
Start: 2022-05-28 | End: 2022-05-28 | Stop reason: HOSPADM

## 2022-05-28 RX ORDER — SODIUM CHLORIDE, SODIUM LACTATE, POTASSIUM CHLORIDE, CALCIUM CHLORIDE 600; 310; 30; 20 MG/100ML; MG/100ML; MG/100ML; MG/100ML
INJECTION, SOLUTION INTRAVENOUS
Status: DISCONTINUED | OUTPATIENT
Start: 2022-05-28 | End: 2022-05-28 | Stop reason: HOSPADM

## 2022-05-28 RX ORDER — FENTANYL CITRATE 50 UG/ML
50 INJECTION, SOLUTION INTRAMUSCULAR; INTRAVENOUS AS NEEDED
Status: DISCONTINUED | OUTPATIENT
Start: 2022-05-28 | End: 2022-05-28 | Stop reason: HOSPADM

## 2022-05-28 RX ORDER — OXYCODONE HYDROCHLORIDE 5 MG/1
5 TABLET ORAL
Status: DISCONTINUED | OUTPATIENT
Start: 2022-05-28 | End: 2022-05-29 | Stop reason: HOSPADM

## 2022-05-28 RX ORDER — DEXMEDETOMIDINE HYDROCHLORIDE 100 UG/ML
INJECTION, SOLUTION INTRAVENOUS AS NEEDED
Status: DISCONTINUED | OUTPATIENT
Start: 2022-05-28 | End: 2022-05-28 | Stop reason: HOSPADM

## 2022-05-28 RX ORDER — HEPARIN SODIUM 1000 [USP'U]/ML
8784 INJECTION, SOLUTION INTRAVENOUS; SUBCUTANEOUS ONCE
Status: COMPLETED | OUTPATIENT
Start: 2022-05-28 | End: 2022-05-28

## 2022-05-28 RX ORDER — FENTANYL CITRATE 50 UG/ML
25 INJECTION, SOLUTION INTRAMUSCULAR; INTRAVENOUS
Status: DISCONTINUED | OUTPATIENT
Start: 2022-05-28 | End: 2022-05-28 | Stop reason: HOSPADM

## 2022-05-28 RX ORDER — ONDANSETRON 2 MG/ML
4 INJECTION INTRAMUSCULAR; INTRAVENOUS AS NEEDED
Status: DISCONTINUED | OUTPATIENT
Start: 2022-05-28 | End: 2022-05-28 | Stop reason: HOSPADM

## 2022-05-28 RX ORDER — ROCURONIUM BROMIDE 10 MG/ML
INJECTION, SOLUTION INTRAVENOUS AS NEEDED
Status: DISCONTINUED | OUTPATIENT
Start: 2022-05-28 | End: 2022-05-28 | Stop reason: HOSPADM

## 2022-05-28 RX ORDER — MIDAZOLAM HYDROCHLORIDE 1 MG/ML
1 INJECTION, SOLUTION INTRAMUSCULAR; INTRAVENOUS AS NEEDED
Status: DISCONTINUED | OUTPATIENT
Start: 2022-05-28 | End: 2022-05-28 | Stop reason: HOSPADM

## 2022-05-28 RX ORDER — GABAPENTIN 100 MG/1
200 CAPSULE ORAL 3 TIMES DAILY
Status: DISCONTINUED | OUTPATIENT
Start: 2022-05-28 | End: 2022-05-29 | Stop reason: HOSPADM

## 2022-05-28 RX ORDER — SODIUM CHLORIDE, SODIUM LACTATE, POTASSIUM CHLORIDE, CALCIUM CHLORIDE 600; 310; 30; 20 MG/100ML; MG/100ML; MG/100ML; MG/100ML
125 INJECTION, SOLUTION INTRAVENOUS CONTINUOUS
Status: DISCONTINUED | OUTPATIENT
Start: 2022-05-28 | End: 2022-05-28 | Stop reason: HOSPADM

## 2022-05-28 RX ORDER — HEPARIN SODIUM 1000 [USP'U]/ML
80 INJECTION, SOLUTION INTRAVENOUS; SUBCUTANEOUS ONCE
Status: COMPLETED | OUTPATIENT
Start: 2022-05-29 | End: 2022-05-28

## 2022-05-28 RX ORDER — ACETAMINOPHEN 325 MG/1
650 TABLET ORAL ONCE
Status: DISCONTINUED | OUTPATIENT
Start: 2022-05-28 | End: 2022-05-28 | Stop reason: HOSPADM

## 2022-05-28 RX ORDER — LIDOCAINE HYDROCHLORIDE 10 MG/ML
0.1 INJECTION, SOLUTION EPIDURAL; INFILTRATION; INTRACAUDAL; PERINEURAL AS NEEDED
Status: DISCONTINUED | OUTPATIENT
Start: 2022-05-28 | End: 2022-05-28 | Stop reason: HOSPADM

## 2022-05-28 RX ORDER — LIDOCAINE HYDROCHLORIDE 20 MG/ML
INJECTION, SOLUTION EPIDURAL; INFILTRATION; INTRACAUDAL; PERINEURAL AS NEEDED
Status: DISCONTINUED | OUTPATIENT
Start: 2022-05-28 | End: 2022-05-28 | Stop reason: HOSPADM

## 2022-05-28 RX ORDER — OXYCODONE HYDROCHLORIDE 5 MG/1
5 TABLET ORAL
Status: DISCONTINUED | OUTPATIENT
Start: 2022-05-28 | End: 2022-05-28

## 2022-05-28 RX ORDER — LIDOCAINE HYDROCHLORIDE 10 MG/ML
INJECTION INFILTRATION; PERINEURAL AS NEEDED
Status: DISCONTINUED | OUTPATIENT
Start: 2022-05-28 | End: 2022-05-28 | Stop reason: HOSPADM

## 2022-05-28 RX ORDER — CEFAZOLIN SODIUM 1 G/3ML
INJECTION, POWDER, FOR SOLUTION INTRAMUSCULAR; INTRAVENOUS AS NEEDED
Status: DISCONTINUED | OUTPATIENT
Start: 2022-05-28 | End: 2022-05-28 | Stop reason: HOSPADM

## 2022-05-28 RX ORDER — HYDROMORPHONE HYDROCHLORIDE 2 MG/ML
INJECTION, SOLUTION INTRAMUSCULAR; INTRAVENOUS; SUBCUTANEOUS AS NEEDED
Status: DISCONTINUED | OUTPATIENT
Start: 2022-05-28 | End: 2022-05-28 | Stop reason: HOSPADM

## 2022-05-28 RX ORDER — PROPOFOL 10 MG/ML
INJECTION, EMULSION INTRAVENOUS AS NEEDED
Status: DISCONTINUED | OUTPATIENT
Start: 2022-05-28 | End: 2022-05-28 | Stop reason: HOSPADM

## 2022-05-28 RX ORDER — DEXAMETHASONE SODIUM PHOSPHATE 4 MG/ML
INJECTION, SOLUTION INTRA-ARTICULAR; INTRALESIONAL; INTRAMUSCULAR; INTRAVENOUS; SOFT TISSUE AS NEEDED
Status: DISCONTINUED | OUTPATIENT
Start: 2022-05-28 | End: 2022-05-28 | Stop reason: HOSPADM

## 2022-05-28 RX ORDER — ONDANSETRON 2 MG/ML
INJECTION INTRAMUSCULAR; INTRAVENOUS AS NEEDED
Status: DISCONTINUED | OUTPATIENT
Start: 2022-05-28 | End: 2022-05-28 | Stop reason: HOSPADM

## 2022-05-28 RX ORDER — HEPARIN SODIUM 1000 [USP'U]/ML
INJECTION, SOLUTION INTRAVENOUS; SUBCUTANEOUS AS NEEDED
Status: DISCONTINUED | OUTPATIENT
Start: 2022-05-28 | End: 2022-05-28 | Stop reason: HOSPADM

## 2022-05-28 RX ORDER — MIDAZOLAM HYDROCHLORIDE 1 MG/ML
0.5 INJECTION, SOLUTION INTRAMUSCULAR; INTRAVENOUS
Status: DISCONTINUED | OUTPATIENT
Start: 2022-05-28 | End: 2022-05-28 | Stop reason: HOSPADM

## 2022-05-28 RX ORDER — NEOSTIGMINE METHYLSULFATE 1 MG/ML
INJECTION, SOLUTION INTRAVENOUS AS NEEDED
Status: DISCONTINUED | OUTPATIENT
Start: 2022-05-28 | End: 2022-05-28 | Stop reason: HOSPADM

## 2022-05-28 RX ORDER — FENTANYL CITRATE 50 UG/ML
INJECTION, SOLUTION INTRAMUSCULAR; INTRAVENOUS AS NEEDED
Status: DISCONTINUED | OUTPATIENT
Start: 2022-05-28 | End: 2022-05-28 | Stop reason: HOSPADM

## 2022-05-28 RX ORDER — GLYCOPYRROLATE 0.2 MG/ML
INJECTION INTRAMUSCULAR; INTRAVENOUS AS NEEDED
Status: DISCONTINUED | OUTPATIENT
Start: 2022-05-28 | End: 2022-05-28 | Stop reason: HOSPADM

## 2022-05-28 RX ADMIN — FENTANYL CITRATE 50 MCG: 50 INJECTION, SOLUTION INTRAMUSCULAR; INTRAVENOUS at 11:08

## 2022-05-28 RX ADMIN — Medication 10 ML: at 13:57

## 2022-05-28 RX ADMIN — SODIUM CHLORIDE 150 ML/HR: 9 INJECTION, SOLUTION INTRAVENOUS at 05:57

## 2022-05-28 RX ADMIN — Medication 10 ML: at 05:59

## 2022-05-28 RX ADMIN — CEFAZOLIN 2 G: 330 INJECTION, POWDER, FOR SOLUTION INTRAMUSCULAR; INTRAVENOUS at 11:05

## 2022-05-28 RX ADMIN — FENTANYL CITRATE 50 MCG: 50 INJECTION, SOLUTION INTRAMUSCULAR; INTRAVENOUS at 10:37

## 2022-05-28 RX ADMIN — MIDAZOLAM 2 MG: 1 INJECTION INTRAMUSCULAR; INTRAVENOUS at 10:28

## 2022-05-28 RX ADMIN — ROCURONIUM BROMIDE 70 MG: 10 SOLUTION INTRAVENOUS at 10:38

## 2022-05-28 RX ADMIN — OXYCODONE 5 MG: 5 TABLET ORAL at 05:58

## 2022-05-28 RX ADMIN — ROCURONIUM BROMIDE 20 MG: 10 SOLUTION INTRAVENOUS at 11:29

## 2022-05-28 RX ADMIN — HEPARIN SODIUM 10000 UNITS: 1000 INJECTION INTRAVENOUS; SUBCUTANEOUS at 11:21

## 2022-05-28 RX ADMIN — LIDOCAINE HYDROCHLORIDE 80 MG: 20 INJECTION, SOLUTION EPIDURAL; INFILTRATION; INTRACAUDAL; PERINEURAL at 10:37

## 2022-05-28 RX ADMIN — HYDROMORPHONE HYDROCHLORIDE 0.5 MG: 2 INJECTION, SOLUTION INTRAMUSCULAR; INTRAVENOUS; SUBCUTANEOUS at 11:29

## 2022-05-28 RX ADMIN — Medication 14 UNITS/KG/HR: at 18:49

## 2022-05-28 RX ADMIN — SODIUM CHLORIDE, PRESERVATIVE FREE 10 ML: 5 INJECTION INTRAVENOUS at 13:57

## 2022-05-28 RX ADMIN — PROPOFOL 150 MG: 10 INJECTION, EMULSION INTRAVENOUS at 10:37

## 2022-05-28 RX ADMIN — ONDANSETRON HYDROCHLORIDE 4 MG: 2 INJECTION, SOLUTION INTRAMUSCULAR; INTRAVENOUS at 12:14

## 2022-05-28 RX ADMIN — FENTANYL CITRATE 25 MCG: 0.05 INJECTION, SOLUTION INTRAMUSCULAR; INTRAVENOUS at 13:20

## 2022-05-28 RX ADMIN — Medication 3 MG: at 12:31

## 2022-05-28 RX ADMIN — HEPARIN SODIUM 8784 UNITS: 1000 INJECTION INTRAVENOUS; SUBCUTANEOUS at 05:47

## 2022-05-28 RX ADMIN — FENTANYL CITRATE 25 MCG: 0.05 INJECTION, SOLUTION INTRAMUSCULAR; INTRAVENOUS at 13:15

## 2022-05-28 RX ADMIN — SODIUM CHLORIDE, POTASSIUM CHLORIDE, SODIUM LACTATE AND CALCIUM CHLORIDE: 600; 310; 30; 20 INJECTION, SOLUTION INTRAVENOUS at 10:12

## 2022-05-28 RX ADMIN — OXYCODONE 10 MG: 5 TABLET ORAL at 22:28

## 2022-05-28 RX ADMIN — GABAPENTIN 200 MG: 100 CAPSULE ORAL at 15:20

## 2022-05-28 RX ADMIN — OXYCODONE 5 MG: 5 TABLET ORAL at 16:28

## 2022-05-28 RX ADMIN — SODIUM CHLORIDE 150 ML/HR: 9 INJECTION, SOLUTION INTRAVENOUS at 22:32

## 2022-05-28 RX ADMIN — GLYCOPYRROLATE 0.4 MG: 0.2 INJECTION, SOLUTION INTRAMUSCULAR; INTRAVENOUS at 12:31

## 2022-05-28 RX ADMIN — GABAPENTIN 200 MG: 100 CAPSULE ORAL at 20:53

## 2022-05-28 RX ADMIN — DEXMEDETOMIDINE HYDROCHLORIDE 10 MCG: 100 INJECTION, SOLUTION, CONCENTRATE INTRAVENOUS at 12:14

## 2022-05-28 RX ADMIN — DEXAMETHASONE SODIUM PHOSPHATE 4 MG: 4 INJECTION, SOLUTION INTRAMUSCULAR; INTRAVENOUS at 11:01

## 2022-05-28 RX ADMIN — OXYCODONE 5 MG: 5 TABLET ORAL at 01:51

## 2022-05-28 RX ADMIN — HEPARIN SODIUM 8780 UNITS: 1000 INJECTION INTRAVENOUS; SUBCUTANEOUS at 23:31

## 2022-05-28 RX ADMIN — Medication 10 ML: at 20:53

## 2022-05-28 RX ADMIN — SODIUM CHLORIDE 150 ML/HR: 9 INJECTION, SOLUTION INTRAVENOUS at 16:28

## 2022-05-28 RX ADMIN — DEXMEDETOMIDINE HYDROCHLORIDE 10 MCG: 100 INJECTION, SOLUTION, CONCENTRATE INTRAVENOUS at 11:30

## 2022-05-28 NOTE — PROGRESS NOTES
S: NAEO. R 5th toe continues to be painful    O: Pulse discrepancy persist. R 5th toe cyanotic. Multiple areas of cyanosis scattered throughout the foot    A/P:   The patients physical exam does not match his arterial duplex. He has palpable pedal pulses suggesting either longstanding SFA occlusion with extensive collateralization or the SFA occlusion has resolved with heparin gtt. Regardless the patient is presently not in rest pain. Neuro and STR intact BL.      High Iikelihood he will need a R 5th toe amputation. Hopeful the 3rd toe wounds will continue to improve.      Given the appearance of his foot and his history of trauma will obtain a complete embolic work-up to include an echo w/ bubble study, CTA chest, abdomen, pelvis w/ BL LE runoff. CTA CAP w/ runoff reviewed. The Thoracic portion of the aorta is spared. There is some anterior layering mural thrombus in the infrarenal abdominal aorta. This may be consistent with his history of trauma and could represent a thrombosed dissection flap. There is no associated aneurysmal degeneration. There is some irregularity to the opacification of the R iliac artery which may just be 2/2 to the contrast timing and contrast mixing. There is an obvious filling defect within the R SFA with largely maintained outflow distal to this.      --Continue heparin gtt at this time  --To OR today for embolectomy  --Will plan to surveil the R 5th toe with close OP follow-up to determine need for amputation  --Patient will need to be maintained on long term AC. Will plan to transition to [address] post-op.  Combination of trauma, RA and smoking likely source but patient would also benefit from hematology consult and hypercoagulable work-up  --F/U results of echo as this should also be ruled out as part of his embolic work-up     Vascular Surgery will continue to follow closely

## 2022-05-28 NOTE — PERIOP NOTES
Chelsea Kim SURGIFOAM WAS GIVEN TO THE STERILE FIELD TO BE USED BY MD DURING PROCEDURE  REF: [id number]  LOT: [id number]  EXP: 11-

## 2022-05-28 NOTE — PERIOP NOTES
FIBRILLAR WAS GIVEN TO THE STERILE FIELD TO BE USED BY MD DURING PROCEDURE  REF: [id number]  LOT: [id number]  EXP: 08-

## 2022-05-28 NOTE — PERIOP NOTES
TRANSFER - OUT REPORT:    Verbal report given to [address], RN(name) on [facility]  being transferred to (unit) for routine progression of care       Report consisted of patients Situation, Background, Assessment and   Recommendations(SBAR). Time Pre op antibiotic given:1105  Anesthesia Stop time: 3743  Severino Present on Transfer to floor:no  Order for Severino on Chart:na  Discharge Prescriptions with Chart:na    Information from the following report(s) SBAR, Kardex, OR Summary, Procedure Summary, Intake/Output, MAR and Med Rec Status was reviewed with the receiving nurse. Opportunity for questions and clarification was provided. Is the patient on 02? YES       L/Min 4    Is the patient on a monitor? NO    Is the nurse transporting with the patient? YES    Surgical Waiting Area notified of patient's transfer from PACU? YES- called patient's wife and updated her on patient status and return to room      The following personal items collected during your admission accompanied patient upon transfer:   Dental Appliance: Dental Appliances: None  Vision: Visual Aid:  At bedside,Glasses  Hearing Aid:    Jewelry:    Clothing:    Other Valuables:    Valuables sent to safe:

## 2022-05-28 NOTE — PERIOP NOTES
SURGICEL ORIGINAL WAS GIVEN TO THE STERILE FIELD TO BE USED BY MD DURING PROCEDURE  REF: [id number]  LOT: [id number]  EXP: [id number]

## 2022-05-28 NOTE — PROGRESS NOTES
Occupational Therapy Note:     Received consult and acknowledged order, chart reviewed, pt is having surgery today, will follow up as able for evaluation.       Janna Diez, OT

## 2022-05-28 NOTE — OP NOTES
Operative Report    Patient: Chio Schmidt MRN: [id number]  SSN: [id number]    YOB: 1955  Age: 74 y.o. Sex: male       Date of Surgery: 5/28/2022     Preoperative Diagnosis: Acute embolism and thrombosis of right femoral artery (HCC) [I82.411]     Postoperative Diagnosis: Acute embolism and thrombosis of right femoral artery (HCC) [I82.411]     Surgeon(s) and Role:     * Barbara Stevenson MD - Primary    Surgical Assistant    Anesthesia: General     Procedure: Procedure(s):  Open right femoral exposure  Right femoral, SFA, popliteal mechanical minnie embolectomy   Right femoral artery bovine patch angioplasty     Finding: Appropriate inflow. Profunda patent. Acute on chronic appearing thrombus c/w thromboembolic event retrieved from SFA. Post embolectomy return brisk back-bleeding. After embolectomy and bovine patch angioplasty. Procedure in Detail: The patient was transported to the operating room. The patient was placed supine on the operating room table. Intubation and monitoring per anesthesia. The patient was prepped and draped in a standard fashion. An appropriate surgical timeout was performed with all members of the team present. The patient was administered Ancef for the antibiotic prophylaxis. Using a number 10 blade a transverse incision was made over the right femoral vessels. Dissection was carried through the skin and subcutaneous tissue using sharp and electrocautery dissection. The femoral sheath was entered and the common femoral artery was dissected free of surrounding structures and control was was then obtained at this level. The dissection was carried onto both the profunda and the SFA both of which were separately dissected free and controlled. At this point the patient was systemically heparinized. The vessels were clamped and an arteriotomy was made using a number 11 blade and extended with Mai scissors.  There was significant intimal thickening with a tight ostium of the SFA. The arteriotomy was carried onto the SFA. Next a 4 Cristian was passed distally with return of a large volume thromboembollic material. Two clean passes were made. Some of this was sent to pathology. The vessel was copiously irrigated then closed using a bovine patch and a running 6-0 prolene suture. Hemostasis and distal pulses was confirmed. The overlying tissue was then closed in a layered fashion consisting of a deep 2-0 vicryl, more superficial 3-0 vicryl and finally skin staples to reapproximate the skin edges. All incisions were dressed in a sterile manner. The patient tolerated the procedure well and was successfully extubated and transported to the recovery area in stable condition. Estimated Blood Loss:  50cc    Tourniquet Time: * No tourniquets in log *      Implants:   Implant Name Type Inv. Item Serial No.  Lot No. LRB No. Used Action   PATCH VASC W0.8XL8CM PERIPH BOV PERICARD N PVC N DEHP CRSS - SNA  PATCH VASC W0.8XL8CM PERIPH BOV PERICARD N PVC N DEHP CRSS NA [employer] [id number] Right 1 Implanted               Specimens:   ID Type Source Tests Collected by Time Destination   1 : Superficial right femoral artery thrombus Fresh Arterial  Lisa Mendez MD 5/28/2022 1142 Pathology           Drains: None                Complications: None    Counts: Sponge and needle counts were correct times two.     Signed By:  Doris Melendez MD     May 28, 2022

## 2022-05-28 NOTE — PROGRESS NOTES
[address] [facility] Adult  Hospitalist Group                                                                                          Hospitalist Progress Note  Carlos Sheridan MD  Answering service: [phone] from in house phone        Date of Service:  2022  NAME:  Lokesh Loya  :  1955  MRN:  [id number]      Admission Summary:    74 y.o. 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 Had arterial duplex demonstrating filing defect within right sfa. He was transported to [facility] for vascular surgery consult. Patient states he had noted pain and discoloration of his right lower extremity fifth digit several weeks ago. Reported this to his PCP who tested him for gout and eventually treated him for cellulitis. Symptoms did not improve, prompting him to seek emergency room care.     The patient denies any fever, chills, chest or abdominal pain, nausea, vomiting, cough, congestion, recent illness, palpitations, or dysuria.     Remarkable vitals on ER Presentations: bp 142/79  Labs Remarkable for: wbc 13.0, hgb 17.1,   ER Images: XR R. Foot: no acute process. Duplex Bilat LEs: The right middle superficial femoral artery is occluded. ER Rx: Morphine 4mg, Heparin gtt       Interval history / Subjective:   Npo for surgery today     Assessment & Plan:     Acute Right foot ischemia   Acute Right middle superficial femoral artery occlusion  H/o PVD  -CTA abd,Thrombosis,Rt ,mid,and distal SFA,not completely occlusive  -CTA chest, no overt systemic embolic source  -IV hydration ,pre and post CT  -echo pending   -Lipid panel A1c  -Heparin gtt  -Vascular surgery consult appreciated,has acute embolism & thrombosis of Rt femoral artery     Undergoing surgery       Rheumatoid arthritis  -Stable.   Not on any chronic therapy        FEN/GI -  [email or web address]  Activity - as tolerated  DVT prophylaxis - Heparin gtt  GI prophylaxis -  NI  Disposition - Home     CODE STATUS:  Full code           Hospital Problems  Never Reviewed          Codes Class Noted POA    Ischemia of right lower extremity ICD-10-CM: I99.8  ICD-9-CM: 459.9  5/27/2022 Unknown                Review of Systems:   Pertinent items are noted in HPI. Vital Signs:    Last 24hrs VS reviewed since prior progress note. Most recent are:  Visit Vitals  /71 (BP 1 Location: Right arm, BP Patient Position: At rest)   Pulse 68   Temp 97.7 °F (36.5 °C)   Resp 11   Ht 6' 3\" (1.905 m)   Wt 109.8 kg (242 lb)   SpO2 98%   BMI 30.25 kg/m²         Intake/Output Summary (Last 24 hours) at 5/28/2022 1014  Last data filed at 5/28/2022 0743  Gross per 24 hour   Intake 1318 ml   Output --   Net 1318 ml        Physical Examination:    surgery   will f/u        Data Review:    Review and/or order of clinical lab test  Review and/or order of tests in the radiology section of CPT  Review and/or order of tests in the medicine section of CPT      Labs:     Recent Labs     05/27/22 1948 05/27/22  1342   WBC 15.0* 13.0*   HGB 17.4* 17.1*   HCT 51.1* 49.6    284     Recent Labs     05/27/22 1948 05/27/22  1342    138   K 3.8 4.3    106   CO2 26 28   BUN 11 12   CREA 0.94 1.01   GLU 95 85   CA 8.9 9.0     Recent Labs     05/27/22 1948 05/27/22  1342   * 20   * 118*   TBILI 0.9 0.5   TP 7.6 6.9   ALB 3.4* 3.6   GLOB 4.2* 3.3     Recent Labs     05/28/22  0459 05/28/22  0201 05/27/22 1948 05/27/22  1522   INR  --   --   --  0.9   PTP  --   --   --  12.6   APTT 35.3* 38.0* 93.9* 26.8      No results for input(s): FE, TIBC, PSAT, FERR in the last 72 hours. No results found for: FOL, RBCF   No results for input(s): PH, PCO2, PO2 in the last 72 hours.   Recent Labs     05/27/22 1948        No results found for: CHOL, CHOLX, CHLST, CHOLV, HDL, HDLP, LDL, LDLC, DLDLP, TGLX, TRIGL, TRIGP, CHHD, CHHDX  No results found for: GLUCPOC  No results found for:  COLOR, APPRN, SPGRU, REFSG, ROSA, PROTU, GLUCU, KETU, BILU, UROU, YADIRA, LEUKU, GLUKE, EPSU, BACTU, WBCU, RBCU, CASTS, UCRY      Medications Reviewed:     Current Facility-Administered Medications   Medication Dose Route Frequency    oxyCODONE IR (ROXICODONE) tablet 5 mg  5 mg Oral Q4H PRN    lactated Ringers infusion  125 mL/hr IntraVENous CONTINUOUS    0.9% sodium chloride infusion  25 mL/hr IntraVENous CONTINUOUS    sodium chloride (NS) flush 5-40 mL  5-40 mL IntraVENous Q8H    sodium chloride (NS) flush 5-40 mL  5-40 mL IntraVENous PRN    lidocaine (PF) (XYLOCAINE) 10 mg/mL (1 %) injection 0.1 mL  0.1 mL SubCUTAneous PRN    fentaNYL citrate (PF) injection 50 mcg  50 mcg IntraVENous PRN    midazolam (VERSED) injection 1 mg  1 mg IntraVENous PRN    midazolam (VERSED) injection 1 mg  1 mg IntraVENous PRN    acetaminophen (TYLENOL) tablet 650 mg  650 mg Oral ONCE    ropivacaine (PF) (NAROPIN) 5 mg/mL (0.5 %) injection 30 mL  30 mL Peripheral Nerve Block ONCE    heparin 25,000 units in  mL infusion  13-36 Units/kg/hr IntraVENous TITRATE    sodium chloride (NS) flush 5-40 mL  5-40 mL IntraVENous Q8H    sodium chloride (NS) flush 5-40 mL  5-40 mL IntraVENous PRN    acetaminophen (TYLENOL) tablet 650 mg  650 mg Oral Q6H PRN    Or    acetaminophen (TYLENOL) suppository 650 mg  650 mg Rectal Q6H PRN    polyethylene glycol (MIRALAX) packet 17 g  17 g Oral DAILY PRN    ondansetron (ZOFRAN ODT) tablet 4 mg  4 mg Oral Q8H PRN    Or    ondansetron (ZOFRAN) injection 4 mg  4 mg IntraVENous Q6H PRN    0.9% sodium chloride infusion  150 mL/hr IntraVENous CONTINUOUS     ______________________________________________________________________  EXPECTED LENGTH OF STAY: - - -  ACTUAL LENGTH OF STAY:          0                 Ni Vail MD

## 2022-05-28 NOTE — PROGRESS NOTES
patient came to the floor from ED, with room air. Vital sign within the normal rang. Alert and orient X 4. Educate about fall and safety precaution. Bed in low position. Call light and room phon within the reach of the patient. Heparin drip was varied with Escobar Suarez RN.

## 2022-05-28 NOTE — PROGRESS NOTES
Physical Therapy  Received consult and acknowledged order, chart reviewed, pt is having surgery today, will follow up tomorrow for evaluation  Lesia Ely PT

## 2022-05-28 NOTE — PROGRESS NOTES
Spoke with the pharmacist regarding recent PTT level 38.0. As per pharmacist, should not change any rate at this time and let it be run/ administer heparin drip with current setting/rate. As per pharmacist suggestion, will draw the other lab of PTT at 5:00 a.m to make sure.

## 2022-05-28 NOTE — H&P
History & Physical    Primary Care Provider: Carolyn Hill, BRENNAN  Source of Information: Patient and chart review    History of Presenting Illness:   Hope Brown is a 74 y.o. 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 Had arterial duplex demonstrating filing defect within right sfa. He was transported to [facility] for vascular surgery consult. Patient states he had noted pain and discoloration of his right lower extremity fifth digit several weeks ago. Reported this to his PCP who tested him for gout and eventually treated him for cellulitis. Symptoms did not improve, prompting him to seek emergency room care. The patient denies any fever, chills, chest or abdominal pain, nausea, vomiting, cough, congestion, recent illness, palpitations, or dysuria. Remarkable vitals on ER Presentations: bp 142/79  Labs Remarkable for: wbc 13.0, hgb 17.1,   ER Images: XR R. Foot: no acute process. Duplex Bilat LEs: The right middle superficial femoral artery is occluded. ER Rx: Morphine 4mg, Heparin gtt     Review of Systems:  A comprehensive review of systems was negative except for that written in the History of Present Illness. Past Medical History:   Diagnosis Date    Arthritis     JRA age 7    Chronic pain     PUD (peptic ulcer disease)       Past Surgical History:   Procedure Laterality Date    HX ORTHOPAEDIC Bilateral 1977    reconstruction of both elbows and wrists  with debridement X3    HX ORTHOPAEDIC Right 1977    fractured femur    HX ORTHOPAEDIC Right 2005    repair of fractured patella    HX OTHER SURGICAL  1977    splenectomy, lacerated pancreas    HX TONSILLECTOMY      UT ABDOMEN SURGERY PROC UNLISTED      cholecystectomy    UT ABDOMEN SURGERY PROC UNLISTED      lysis of adhesions X3     Prior to Admission medications    Medication Sig Start Date End Date Taking? Authorizing Provider   acetaminophen (TYLENOL EXTRA STRENGTH) 500 mg tablet Take 500 mg by mouth every six (6) hours as needed for Pain. Provider, Historical     No Known Allergies   History reviewed. No pertinent family history. SOCIAL HISTORY:  Patient resides:  Independently x   Assisted Living    SNF    With family care       Smoking history:   None x   Former    Chronic      Alcohol history:   None x   Social    Chronic      Ambulates:   Independently x   w/cane    w/walker    w/wc    CODE STATUS:  DNR    Full x   Other      Objective:     Physical Exam:     Visit Vitals  BP (!) 147/79   Pulse 69   Temp 98.3 °F (36.8 °C)   Resp 16   Ht 6' 3\" (1.905 m)   Wt 109.8 kg (242 lb)   SpO2 94%   BMI 30.25 kg/m²      O2 Device: None (Room air)    General:  Alert, cooperative, no distress, appears stated age. Head:  Normocephalic, without obvious abnormality, atraumatic. Eyes:  Conjunctivae/corneas clear. PERRL, EOMs intact. Nose: Nares normal. Septum midline. Mucosa normal.        Neck: Supple, symmetrical, trachea midline, no carotid bruit and no JVD. Lungs:   Clear to auscultation bilaterally. Chest wall:  No tenderness or deformity. Heart:  Regular rate and rhythm, S1, S2 normal, no murmur, click, rub or gallop. Abdomen:   Soft, non-tender. Bowel sounds normal. No masses,  No organomegaly. Extremities: Extremities normal, atraumatic, no edema. Cyanosis of right lower extremity fifth digit. Pulses: 2+ and symmetric all extremities. Skin: Skin color, texture, turgor normal. No rashes or lesions   Neurologic: CNII-XII intact.           EKG:  NSR    Data Review:     Recent Days:  Recent Labs     05/27/22 1948 05/27/22  1342   WBC 15.0* 13.0*   HGB 17.4* 17.1*   HCT 51.1* 49.6    284     Recent Labs     05/27/22 1948 05/27/22  1522 05/27/22  1342     --  138   K 3.8  --  4.3     --  106   CO2 26  --  28   GLU 95  --  85   BUN 11  --  12   CREA 0.94  --  1.01   CA 8.9  -- 9. 0   ALB 3.4*  --  3.6   *  --  20   INR  --  0.9  --      No results for input(s): PH, PCO2, PO2, HCO3, FIO2 in the last 72 hours. 24 Hour Results:  Recent Results (from the past 24 hour(s))   CBC WITH AUTOMATED DIFF    Collection Time: 05/27/22  1:42 PM   Result Value Ref Range    WBC 13.0 (H) 4.1 - 11.1 K/uL    RBC 5.42 4.10 - 5.70 M/uL    HGB 17.1 (H) 12.1 - 17.0 g/dL    HCT 49.6 36.6 - 50.3 %    MCV 91.5 80.0 - 99.0 FL    MCH 31.5 26.0 - 34.0 PG    MCHC 34.5 30.0 - 36.5 g/dL    RDW 14.0 11.5 - 14.5 %    PLATELET 124 974 - 445 K/uL    MPV 10.7 8.9 - 12.9 FL    NRBC 0 0.0  WBC    NEUTROPHILS 37 32 - 75 %    BAND NEUTROPHILS 1 0 - 6 %    LYMPHOCYTES 51 (H) 12 - 49 %    MONOCYTES 9 5 - 13 %    EOSINOPHILS 0 0 - 7 %    BASOPHILS 2 (H) 0 - 1 %    IMMATURE GRANULOCYTES 0 %    ABS. NEUTROPHILS 4.9 1.8 - 8.0 K/UL    ABS. LYMPHOCYTES 6.6 (H) 0.8 - 3.5 K/UL    ABS. MONOCYTES 1.2 (H) 0.0 - 1.0 K/UL    ABS. EOSINOPHILS 0.0 0.0 - 0.4 K/UL    ABS. BASOPHILS 0.3 (H) 0.0 - 0.1 K/UL    ABS. IMM. GRANS. 0.0 K/UL    DF Manual      RBC COMMENTS Normocytic, Normochromic     METABOLIC PANEL, COMPREHENSIVE    Collection Time: 05/27/22  1:42 PM   Result Value Ref Range    Sodium 138 136 - 145 mmol/L    Potassium 4.3 3.5 - 5.1 mmol/L    Chloride 106 97 - 108 mmol/L    CO2 28 21 - 32 mmol/L    Anion gap 4 (L) 5 - 15 mmol/L    Glucose 85 65 - 100 mg/dL    BUN 12 6 - 20 mg/dL    Creatinine 1.01 0.70 - 1.30 mg/dL    BUN/Creatinine ratio 12 12 - 20      GFR est AA >60 >60 ml/min/1.73m2    GFR est non-AA >60 >60 ml/min/1.73m2    Calcium 9.0 8.5 - 10.1 mg/dL    Bilirubin, total 0.5 0.2 - 1.0 mg/dL    AST (SGOT) 15 15 - 37 U/L    ALT (SGPT) 20 12 - 78 U/L    Alk.  phosphatase 118 (H) 45 - 117 U/L    Protein, total 6.9 6.4 - 8.2 g/dL    Albumin 3.6 3.5 - 5.0 g/dL    Globulin 3.3 2.0 - 4.0 g/dL    A-G Ratio 1.1 1.1 - 2.2     PTT    Collection Time: 05/27/22  3:22 PM   Result Value Ref Range    aPTT 26.8 21.2 - 34.1 sec    aPTT, therapeutic range   82 - 109 sec   PROTHROMBIN TIME + INR    Collection Time: 05/27/22  3:22 PM   Result Value Ref Range    Prothrombin time 12.6 11.9 - 14.6 sec    INR 0.9 0.9 - 1.1     DUPLEX LOW EXT ARTERY RIGHT W GRISELDA    Collection Time: 05/27/22  3:28 PM   Result Value Ref Range    Right Dist XI Velocity 30.7 cm/s    Right popliteal dist sys PSV 25.8 cm/s    Right Dist PTA PSV 36.5 cm/s    Right super femoral dist sys PSV 28.3 cm/s    Right super femoral prox sys PSV 23.6 cm/s    Right SFA Prox Gavin Ratio 0.3     Right arm  mmHg    Right posterior tibial 80 mmHg    Right GRISELDA 0.57     Right CFA dist sys PSV 79.5 cm/s   EKG, 12 LEAD, INITIAL    Collection Time: 05/27/22  6:58 PM   Result Value Ref Range    Ventricular Rate 70 BPM    Atrial Rate 70 BPM    P-R Interval 146 ms    QRS Duration 96 ms    Q-T Interval 396 ms    QTC Calculation (Bezet) 427 ms    Calculated P Axis 69 degrees    Calculated R Axis 3 degrees    Calculated T Axis 67 degrees    Diagnosis       Normal sinus rhythm  Normal ECG  No previous ECGs available     CBC WITH AUTOMATED DIFF    Collection Time: 05/27/22  7:48 PM   Result Value Ref Range    WBC 15.0 (H) 4.1 - 11.1 K/uL    RBC 5.50 4. 10 - 5.70 M/uL    HGB 17.4 (H) 12.1 - 17.0 g/dL    HCT 51.1 (H) 36.6 - 50.3 %    MCV 92.9 80.0 - 99.0 FL    MCH 31.6 26.0 - 34.0 PG    MCHC 34.1 30.0 - 36.5 g/dL    RDW 14.0 11.5 - 14.5 %    PLATELET 604 840 - 158 K/uL    MPV 10.6 8.9 - 12.9 FL    NEUTROPHILS 60 32 - 75 %    LYMPHOCYTES 33 12 - 49 %    MONOCYTES 5 5 - 13 %    EOSINOPHILS 1 0 - 7 %    BASOPHILS 1 0 - 1 %    IMMATURE GRANULOCYTES 0 %    ABS. NEUTROPHILS 8.8 (H) 1.8 - 8.0 K/UL    ABS. LYMPHOCYTES 5.0 (H) 0.8 - 3.5 K/UL    ABS. MONOCYTES 0.8 0.0 - 1.0 K/UL    ABS. EOSINOPHILS 0.2 0.0 - 0.4 K/UL    ABS. BASOPHILS 0.2 (H) 0.0 - 0.1 K/UL    ABS. IMM.  GRANS. 0.0 K/UL    DF MANUAL      RBC COMMENTS ANISOCYTOSIS  1+        WBC COMMENTS REACTIVE LYMPHS     METABOLIC PANEL, COMPREHENSIVE    Collection Time: 05/27/22  7:48 PM   Result Value Ref Range    Sodium 136 136 - 145 mmol/L    Potassium 3.8 3.5 - 5.1 mmol/L    Chloride 105 97 - 108 mmol/L    CO2 26 21 - 32 mmol/L    Anion gap 5 5 - 15 mmol/L    Glucose 95 65 - 100 mg/dL    BUN 11 6 - 20 MG/DL    Creatinine 0.94 0.70 - 1.30 MG/DL    BUN/Creatinine ratio 12 12 - 20      GFR est AA >60 >60 ml/min/1.73m2    GFR est non-AA >60 >60 ml/min/1.73m2    Calcium 8.9 8.5 - 10.1 MG/DL    Bilirubin, total 0.9 0.2 - 1.0 MG/DL    ALT (SGPT) 105 (H) 12 - 78 U/L    AST (SGOT) 130 (H) 15 - 37 U/L    Alk. phosphatase 145 (H) 45 - 117 U/L    Protein, total 7.6 6.4 - 8.2 g/dL    Albumin 3.4 (L) 3.5 - 5.0 g/dL    Globulin 4.2 (H) 2.0 - 4.0 g/dL    A-G Ratio 0.8 (L) 1.1 - 2.2     PTT    Collection Time: 05/27/22  7:48 PM   Result Value Ref Range    aPTT 93.9 (HH) 22.1 - 31.0 sec    aPTT, therapeutic range     58.0 - 77.0 SECS   LACTIC ACID    Collection Time: 05/27/22  7:48 PM   Result Value Ref Range    Lactic acid 0.6 0.4 - 2.0 MMOL/L   CK    Collection Time: 05/27/22  7:48 PM   Result Value Ref Range     39 - 308 U/L         Imaging:     Assessment:     Emmett Kline is a 74 y.o. 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 Plan:       Right foot ischemia   Right middle superficial femoral artery occlusion  PVD  -Embolic w/u in process: echo, CTA chest, abd et Pelvis  -Lipid panel A1c in AM  -Continue Heparin gtt  -Vascular surgery on consult    Rheumatoid arthritis  -Stable.   Not on any chronic therapy      FEN/GI -  [email or web address]  Activity - as tolerated  DVT prophylaxis - Heparin gtt  GI prophylaxis -  NI  Disposition - Home    CODE STATUS:  Full code       Signed By: Lukas Dominguez MD     May 27, 2022

## 2022-05-28 NOTE — ANESTHESIA POSTPROCEDURE EVALUATION
Post-Anesthesia Evaluation and Assessment    Patient: Eric Solano MRN: [id number]  SSN: [id number]    YOB: 1955  Age: 74 y.o. Sex: male      I have evaluated the patient and they are stable and ready for discharge from the PACU. Cardiovascular Function/Vital Signs  Visit Vitals  /67   Pulse 68   Temp 36.6 °C (97.9 °F)   Resp 17   Ht 6' 3\" (1.905 m)   Wt 109.8 kg (242 lb)   SpO2 91%   BMI 30.25 kg/m²       Patient is status post General anesthesia for Procedure(s):  Open Right Femoral embolectomy with bovine patch angioplasty. Nausea/Vomiting: None    Postoperative hydration reviewed and adequate. Pain:  Pain Scale 1: Numeric (0 - 10) (05/28/22 1320)  Pain Intensity 1: 6 (05/28/22 1320)   Managed    Neurological Status:   Neuro (WDL): Within Defined Limits (05/28/22 1315)  Neuro  Neurologic State: Drowsy (05/28/22 1315)  LUE Motor Response: Purposeful (05/28/22 1315)  LLE Motor Response: Purposeful (05/28/22 1315)  RUE Motor Response: Purposeful (05/28/22 1315)  RLE Motor Response: Purposeful (05/28/22 1315)   At baseline    Mental Status, Level of Consciousness: Alert and  oriented to person, place, and time    Pulmonary Status:   O2 Device: Nasal cannula (05/28/22 1315)   Adequate oxygenation and airway patent    Complications related to anesthesia: None    Post-anesthesia assessment completed. No concerns    Signed By: Gretel aCmpos MD     May 28, 2022              Procedure(s):  Open Right Femoral embolectomy with bovine patch angioplasty. general    <[email or web address]>    INITIAL Post-op Vital signs:   Vitals Value Taken Time   /65 05/28/22 1330   Temp 36.6 °C (97.9 °F) 05/28/22 1250   Pulse 69 05/28/22 1335   Resp 9 05/28/22 1335   SpO2 96 % 05/28/22 1335   Vitals shown include unvalidated device data.

## 2022-05-28 NOTE — PROGRESS NOTES
Bedside shift change report given to Markus Hargrove RN (oncoming nurse) by [facility], RN (offgoing nurse). Report included the following information SBAR, Kardex, ED Summary, OR Summary, Procedure Summary, Intake/Output, MAR, Recent Results and Med Rec Status.

## 2022-05-28 NOTE — ROUTINE PROCESS
Patient: Keily Hayes MRN: [id number]  SSN: [id number]   YOB: 1955  Age: 74 y.o. Sex: male     Patient is status post Procedure(s):  Open Right Femoral embolectomy with bovine patch angioplasty. Surgeon(s) and Role:     * Eliza Lee MD - Primary    Local/Dose/Irrigation:                  Peripheral IV 05/27/22 Left; Anterior; Upper Arm (Active)   Site Assessment Clean, dry, & intact 05/28/22 0807   Phlebitis Assessment 0 05/28/22 0807   Infiltration Assessment 0 05/28/22 0807   Dressing Status Clean, dry, & intact 05/28/22 0807   Dressing Type Tape;Transparent 05/28/22 0807   Hub Color/Line Status Green;Capped 05/28/22 6090   Action Taken Open ports on tubing capped 05/28/22 0807   Alcohol Cap Used Yes 05/28/22 0807       Peripheral IV 05/27/22 Left Hand (Active)   Site Assessment Clean, dry, & intact 05/28/22 0807   Phlebitis Assessment 0 05/28/22 0807   Infiltration Assessment 0 05/28/22 0807   Dressing Status Clean, dry, & intact 05/28/22 0807   Dressing Type Tape;Transparent 05/28/22 0807   Hub Color/Line Status Pink;Patent 05/28/22 0599   Action Taken Open ports on tubing capped 05/28/22 0807   Alcohol Cap Used Yes 05/28/22 0807       Peripheral IV 05/27/22 Anterior;Right Forearm (Active)   Site Assessment Clean, dry, & intact 05/28/22 0807   Phlebitis Assessment 0 05/28/22 0807   Infiltration Assessment 0 05/28/22 0807   Dressing Status Clean, dry, & intact 05/28/22 0807   Dressing Type Transparent 05/28/22 0807   Hub Color/Line Status Pink;Patent 05/28/22 5489   Action Taken Open ports on tubing capped 05/28/22 0807   Alcohol Cap Used Yes 05/28/22 0807            Airway - Endotracheal Tube 05/28/22 Oral (Active)                   Dressing/Packing:  Incision 05/28/22 Groin Right-Dressing/Treatment: Non-adherent;Tegaderm/Transparent film dressing (telfa with tegaderm) (05/28/22 1223)    Splint/Cast:  ]    Other:

## 2022-05-28 NOTE — ROUTINE PROCESS
Has a final transfer review been performed? Yes    Reason for Admission: r femoral artery occlusion  Patient comes from: [place] [facility] transfer  Mental Status: Alert and oriented  ADL:self care  Ambulation:mild difficulty  Pertinent Info/Safety Concerns: pt is on heparin drip currently at 10  COVID Status: Not Indicated  MEWS Score: 0    Vitals:    05/27/22 1953 05/27/22 1958 05/27/22 2003 05/27/22 2045   BP:    (!) 147/79   Pulse: 73 86 68 69   Resp: 19 19 16 16   Temp:    98.3 °F (36.8 °C)   SpO2: 94% 96% 94% 94%   Weight:       Height:         Transport mode:ED stretcher    TRANSFER - OUT REPORT:    Report given to farooq(name) on [facility]  being transferred to (unit) for routine progression of care       Report consisted of patient's Situation, Background, Assessment and   Recommendations(SBAR). Information from the following report(s) SBAR, Kardex, ED Summary and MAR was reviewed with the receiving nurse. Lines:   Peripheral IV 05/27/22 Left; Anterior; Upper Arm (Active)   Site Assessment Clean, dry, & intact 05/27/22 1950   Phlebitis Assessment 0 05/27/22 1950   Infiltration Assessment 0 05/27/22 1950   Dressing Status Clean, dry, & intact 05/27/22 1950   Dressing Type Transparent 05/27/22 1950   Hub Color/Line Status Green;Capped;Flushed;Patent 05/27/22 1950   Action Taken Blood drawn 05/27/22 1950       Peripheral IV 05/27/22 Left Hand (Active)   Site Assessment Clean, dry, & intact 05/27/22 2333   Phlebitis Assessment 0 05/27/22 2333   Infiltration Assessment 0 05/27/22 2333   Dressing Status Clean, dry, & intact 05/27/22 2333   Dressing Type Transparent 05/27/22 2333   Hub Color/Line Status Pink 05/27/22 2333        Opportunity for questions and clarification was provided.

## 2022-05-28 NOTE — CONSULTS
Consult    Patient: Joey Pearce MRN: [id number]  SSN: [id number]    YOB: 1955  Age: 74 y.o. Sex: male      Subjective: Joey Pearce is a 74 y.o. male who is being seen for evaluation of R 5th toe ischemia. He reports a history of Rheumatoid arthritis. He is a smoker. 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 He first noted color changes to his R great toe about two weeks ago. Around the same time he also noted that his right leg felt a bit \"heavy\" with ambulation with a sensation of dragging it behind himself after taking >15 steps. He presented to the ED today after he noted the toe was increasingly purple. He was initially seen at [facility]. An arterial duplex reportedly showed a filling defect within the SFA. He was started on a heparin drip and transferred to [address] [place] [address] for further work-up and evaluation. Past Medical History:   Diagnosis Date    Arthritis     JRA age 7    Chronic pain     PUD (peptic ulcer disease)      Past Surgical History:   Procedure Laterality Date    HX ORTHOPAEDIC Bilateral 1977    reconstruction of both elbows and wrists  with debridement X3    HX ORTHOPAEDIC Right 1977    fractured femur    HX ORTHOPAEDIC Right 2005    repair of fractured patella    HX OTHER SURGICAL  1977    splenectomy, lacerated pancreas    HX TONSILLECTOMY      ME ABDOMEN SURGERY PROC UNLISTED      cholecystectomy    ME ABDOMEN SURGERY PROC UNLISTED      lysis of adhesions X3      History reviewed. No pertinent family history.   Social History     Tobacco Use    Smoking status: Current Every Day Smoker     Packs/day: 1.00     Years: 45.00     Pack years: 45.00    Smokeless tobacco: Never Used   Substance Use Topics    Alcohol use: No      Current Facility-Administered Medications   Medication Dose Route Frequency Provider Last Rate Last Admin    heparin 25,000 units in  mL infusion  13-36 Units/kg/hr IntraVENous TITRATE Jamie Baer MD 14.3 mL/hr at 05/27/22 1920 13 Units/kg/hr at 05/27/22 1920     Current Outpatient Medications   Medication Sig Dispense Refill    acetaminophen (TYLENOL EXTRA STRENGTH) 500 mg tablet Take 500 mg by mouth every six (6) hours as needed for Pain. No Known Allergies    Review of Systems:  A comprehensive review of systems was negative except for that written in the History of Present Illness. Objective:     Vitals:    05/27/22 1948 05/27/22 1953 05/27/22 1958 05/27/22 2003   BP:       Pulse: 63 73 86 68   Resp: 12 19 19 16   Temp:       SpO2: 93% 94% 96% 94%   Weight:       Height:            Physical Exam:  General: Patient is pleasant and cooperative and appears to be in no acute distress. HEENT: Normocephalic atraumatic. Appropriate tracking. No scleral icterus. Nares patent. Trachea is midline. Chest: Unlabored respirations. Symmetrical chest expansion. Cardiac: Regular rate and rhythm. Acyanotic  Abdomen: Abdomen is soft, nontender, nondistended. Extremities: Moves all extremities. Vascular: The patient has strong palpable femoral pulses BL. On the L he has strong palpable pedal pulses. On the R his pedal pulses are likewise palpable but slightly diminished relative the L. His R 5th toe is cyanotic/ischemic in appearance and quite tender. His R 3rd toe has some scattered areas of cyanosis/ischemia. [place] arterial duplex demonstrates an occluded R SFA with monophasic flow into the distal SFA, pop, AT and PT. His R GRISELDA is 0.57. Assessment:     Right foot ischemia    Plan:     The patients physical exam does not match his arterial duplex.  He has palpable pedal pulses suggesting either longstanding SFA occlusion with extensive collateralization or the SFA occlusion has resolved with heparin gtt. Regardless the patient is presently not in rest pain. Neuro and STR intact BL. No role for acute surgical intervention at this time as his limb is not acutely threatened. High Iikelihood he will need a R 5th toe amputation. Hopeful the 3rd toe wounds will continue to improve. Given the appearance of his foot and his history of trauma will obtain a complete embolic work-up to include an echo w/ bubble study, CTA chest, abdomen, pelvis w/ BL LE runoff.     --Continue heparin gtt at this time  --Will F/U CT imaging and echo  --Patient will require pre- and post- CT hydration    Vascular Surgery will continue to follow closely    Addendum: SFA thrombus now partially occlusive which explains his exam.  --NPO @ [facility]  --Will evaluate his progress in AM and decide on need for intervention    Signed By: Lata Espino MD     May 27, 2022

## 2022-05-28 NOTE — PERIOP NOTES
SURGICEL WAS GIVEN TO THE STERILE FIELD TO BE USED BY MD DURING PROCEDURE  REF: [id number]  LOT: [id number]  EXP: 09-

## 2022-05-28 NOTE — PERIOP NOTES
TRANSFER - IN REPORT:    Verbal report received from [id number], RN(name) on [facility]  being received from 6E(unit) for ordered procedure      Report consisted of patients Situation, Background, Assessment and   Recommendations(SBAR). Information from the following report(s) SBAR, Kardex, ED Summary, Procedure Summary, Intake/Output, MAR and Recent Results was reviewed with the receiving nurse. Opportunity for questions and clarification was provided. Assessment completed upon patients arrival to unit and care assumed.

## 2022-05-28 NOTE — ANESTHESIA PREPROCEDURE EVALUATION
Anesthetic History   No history of anesthetic complications            Review of Systems / Medical History  Patient summary reviewed, nursing notes reviewed and pertinent labs reviewed    Pulmonary  Within defined limits                 Neuro/Psych   Within defined limits           Cardiovascular              PAD         GI/Hepatic/Renal           PUD     Endo/Other        Arthritis     Other Findings              Physical Exam    Airway  Mallampati: II  TM Distance: 4 - 6 cm  Neck ROM: normal range of motion   Mouth opening: Normal     Cardiovascular  Regular rate and rhythm,  S1 and S2 normal,  no murmur, click, rub, or gallop  Rhythm: regular  Rate: normal         Dental  No notable dental hx       Pulmonary  Breath sounds clear to auscultation               Abdominal  GI exam deferred       Other Findings            Anesthetic Plan    ASA: 2  Anesthesia type: general      Post-op pain plan if not by surgeon: peripheral nerve block single    Induction: Intravenous  Anesthetic plan and risks discussed with: Patient

## 2022-05-29 VITALS
TEMPERATURE: 98.5 F | HEIGHT: 75 IN | WEIGHT: 242 LBS | OXYGEN SATURATION: 98 % | HEART RATE: 61 BPM | RESPIRATION RATE: 17 BRPM | SYSTOLIC BLOOD PRESSURE: 131 MMHG | DIASTOLIC BLOOD PRESSURE: 75 MMHG | BODY MASS INDEX: 30.09 KG/M2

## 2022-05-29 LAB
ANION GAP SERPL CALC-SCNC: 3 MMOL/L (ref 5–15)
APPEARANCE UR: CLEAR
APTT PPP: 34.7 SEC (ref 22.1–31)
APTT PPP: 60.2 SEC (ref 22.1–31)
APTT PPP: >130 SEC (ref 22.1–31)
BACTERIA URNS QL MICRO: NEGATIVE /HPF
BASOPHILS # BLD: 0 K/UL (ref 0–0.1)
BASOPHILS NFR BLD: 0 % (ref 0–1)
BILIRUB UR QL: NEGATIVE
BUN SERPL-MCNC: 7 MG/DL (ref 6–20)
BUN/CREAT SERPL: 9 (ref 12–20)
CALCIUM SERPL-MCNC: 7.4 MG/DL (ref 8.5–10.1)
CHLORIDE SERPL-SCNC: 111 MMOL/L (ref 97–108)
CHOLEST SERPL-MCNC: 125 MG/DL
CO2 SERPL-SCNC: 25 MMOL/L (ref 21–32)
COLOR UR: NORMAL
CREAT SERPL-MCNC: 0.75 MG/DL (ref 0.7–1.3)
DIFFERENTIAL METHOD BLD: ABNORMAL
EOSINOPHIL # BLD: 0.1 K/UL (ref 0–0.4)
EOSINOPHIL NFR BLD: 0 % (ref 0–7)
EPITH CASTS URNS QL MICRO: NORMAL /LPF
ERYTHROCYTE [DISTWIDTH] IN BLOOD BY AUTOMATED COUNT: 14.2 % (ref 11.5–14.5)
GLUCOSE SERPL-MCNC: 111 MG/DL (ref 65–100)
GLUCOSE UR STRIP.AUTO-MCNC: NEGATIVE MG/DL
HCT VFR BLD AUTO: 42 % (ref 36.6–50.3)
HDLC SERPL-MCNC: 42 MG/DL
HDLC SERPL: 3 {RATIO} (ref 0–5)
HGB BLD-MCNC: 14.2 G/DL (ref 12.1–17)
HGB UR QL STRIP: NEGATIVE
HYALINE CASTS URNS QL MICRO: NORMAL /LPF (ref 0–5)
IMM GRANULOCYTES # BLD AUTO: 0.1 K/UL (ref 0–0.04)
IMM GRANULOCYTES NFR BLD AUTO: 0 % (ref 0–0.5)
KETONES UR QL STRIP.AUTO: NEGATIVE MG/DL
LDLC SERPL CALC-MCNC: 52.2 MG/DL (ref 0–100)
LEUKOCYTE ESTERASE UR QL STRIP.AUTO: NEGATIVE
LYMPHOCYTES # BLD: 4.4 K/UL (ref 0.8–3.5)
LYMPHOCYTES NFR BLD: 29 % (ref 12–49)
MCH RBC QN AUTO: 31.2 PG (ref 26–34)
MCHC RBC AUTO-ENTMCNC: 33.8 G/DL (ref 30–36.5)
MCV RBC AUTO: 92.3 FL (ref 80–99)
MONOCYTES # BLD: 1.5 K/UL (ref 0–1)
MONOCYTES NFR BLD: 9 % (ref 5–13)
NEUTS SEG # BLD: 9.4 K/UL (ref 1.8–8)
NEUTS SEG NFR BLD: 61 % (ref 32–75)
NITRITE UR QL STRIP.AUTO: NEGATIVE
NRBC # BLD: 0 K/UL (ref 0–0.01)
NRBC BLD-RTO: 0 PER 100 WBC
PH UR STRIP: 5.5 [PH] (ref 5–8)
PLATELET # BLD AUTO: 222 K/UL (ref 150–400)
PMV BLD AUTO: 10.8 FL (ref 8.9–12.9)
POTASSIUM SERPL-SCNC: 4.1 MMOL/L (ref 3.5–5.1)
PROT UR STRIP-MCNC: NEGATIVE MG/DL
RBC # BLD AUTO: 4.55 M/UL (ref 4.1–5.7)
RBC #/AREA URNS HPF: NORMAL /HPF (ref 0–5)
SODIUM SERPL-SCNC: 139 MMOL/L (ref 136–145)
SP GR UR REFRACTOMETRY: 1.01 (ref 1–1.03)
THERAPEUTIC RANGE,PTTT: ABNORMAL SECS (ref 58–77)
TRIGL SERPL-MCNC: 154 MG/DL (ref ?–150)
UR CULT HOLD, URHOLD: NORMAL
UROBILINOGEN UR QL STRIP.AUTO: 1 EU/DL (ref 0.2–1)
VLDLC SERPL CALC-MCNC: 30.8 MG/DL
WBC # BLD AUTO: 15.4 K/UL (ref 4.1–11.1)
WBC URNS QL MICRO: NORMAL /HPF (ref 0–4)

## 2022-05-29 PROCEDURE — 36415 COLL VENOUS BLD VENIPUNCTURE: CPT

## 2022-05-29 PROCEDURE — 81001 URINALYSIS AUTO W/SCOPE: CPT

## 2022-05-29 PROCEDURE — 80048 BASIC METABOLIC PNL TOTAL CA: CPT

## 2022-05-29 PROCEDURE — 74011000250 HC RX REV CODE- 250: Performed by: STUDENT IN AN ORGANIZED HEALTH CARE EDUCATION/TRAINING PROGRAM

## 2022-05-29 PROCEDURE — 74011250636 HC RX REV CODE- 250/636: Performed by: EMERGENCY MEDICINE

## 2022-05-29 PROCEDURE — 74011250637 HC RX REV CODE- 250/637: Performed by: NURSE PRACTITIONER

## 2022-05-29 PROCEDURE — 97165 OT EVAL LOW COMPLEX 30 MIN: CPT

## 2022-05-29 PROCEDURE — 85025 COMPLETE CBC W/AUTO DIFF WBC: CPT

## 2022-05-29 PROCEDURE — 74011250637 HC RX REV CODE- 250/637: Performed by: SURGERY

## 2022-05-29 PROCEDURE — 74011250636 HC RX REV CODE- 250/636: Performed by: STUDENT IN AN ORGANIZED HEALTH CARE EDUCATION/TRAINING PROGRAM

## 2022-05-29 PROCEDURE — 85730 THROMBOPLASTIN TIME PARTIAL: CPT

## 2022-05-29 PROCEDURE — 80061 LIPID PANEL: CPT

## 2022-05-29 PROCEDURE — G0378 HOSPITAL OBSERVATION PER HR: HCPCS

## 2022-05-29 PROCEDURE — 96366 THER/PROPH/DIAG IV INF ADDON: CPT

## 2022-05-29 PROCEDURE — 74011250637 HC RX REV CODE- 250/637: Performed by: FAMILY MEDICINE

## 2022-05-29 RX ORDER — ACETAMINOPHEN 325 MG/1
650 TABLET ORAL
Qty: 20 TABLET | Refills: 0 | Status: SHIPPED | OUTPATIENT
Start: 2022-05-29

## 2022-05-29 RX ADMIN — Medication 15 UNITS/KG/HR: at 13:04

## 2022-05-29 RX ADMIN — GABAPENTIN 200 MG: 100 CAPSULE ORAL at 15:34

## 2022-05-29 RX ADMIN — GABAPENTIN 200 MG: 100 CAPSULE ORAL at 08:04

## 2022-05-29 RX ADMIN — Medication 10 ML: at 13:45

## 2022-05-29 RX ADMIN — Medication 10 ML: at 05:03

## 2022-05-29 RX ADMIN — Medication 15 UNITS/KG/HR: at 07:51

## 2022-05-29 RX ADMIN — SODIUM CHLORIDE 150 ML/HR: 9 INJECTION, SOLUTION INTRAVENOUS at 05:03

## 2022-05-29 RX ADMIN — OXYCODONE 5 MG: 5 TABLET ORAL at 14:13

## 2022-05-29 RX ADMIN — APIXABAN 10 MG: 5 TABLET, FILM COATED ORAL at 16:26

## 2022-05-29 RX ADMIN — SODIUM CHLORIDE 150 ML/HR: 9 INJECTION, SOLUTION INTRAVENOUS at 12:05

## 2022-05-29 NOTE — PROGRESS NOTES
Problem: Falls - Risk of  Goal: *Absence of Falls  Description: Document Jg Rahlus Fall Risk and appropriate interventions in the flowsheet.   Outcome: Progressing Towards Goal  Note: Fall Risk Interventions:            Medication Interventions: Patient to call before getting OOB

## 2022-05-29 NOTE — PROGRESS NOTES
OCCUPATIONAL THERAPY EVALUATION/DISCHARGE  Patient: Perfecto Romero (47 y.o. male)  Date: 5/29/2022  Primary Diagnosis: Ischemia of right lower extremity [I99.8]  Procedure(s) (LRB):  Open Right Femoral embolectomy with bovine patch angioplasty (Right) 1 Day Post-Op   Precautions: none       ASSESSMENT  Based on the objective data described below, the patient presents with no functional deficits and is completing dynamic ADL routine, with DME, with [place]. Pt verbalizes daily skin checks to RLE, as well as, seated surface within walk-in shower. Pt reports good PRN assist from wife. No LOB noted during in-room ambulation, with pt reporting no pain at time of evaluation. Given pt's [place] with ADL completion, good PRN assist from wife available within home and DME/AE needs fulfilled, no other acute OT needs identified, with OT answering pt's questions/concerns and pt thanking therapist for his efforts. Current Level of Function (ADLs/self-care): Independent    Functional Outcome Measure: The patient scored 100/100 on the Barthel Index outcome measure. Other factors to consider for discharge: none     PLAN :  Recommend with staff: OOB meals, Independent ADLs    Recommendation for discharge: (in order for the patient to meet his/her long term goals)  No skilled occupational therapy/ follow up rehabilitation needs identified at this time. This discharge recommendation:  Has been made in collaboration with the attending provider and/or case management    IF patient discharges home will need the following DME: patient owns DME required for discharge       SUBJECTIVE:   Patient stated Thanks for your help.     OBJECTIVE DATA SUMMARY:   HISTORY:   Past Medical History:   Diagnosis Date    Arthritis     JRA age 7    Chronic pain     PUD (peptic ulcer disease)      Past Surgical History:   Procedure Laterality Date    HX ORTHOPAEDIC Bilateral 1977    reconstruction of both elbows and wrists with debridement X3    HX ORTHOPAEDIC Right 1977    fractured femur    HX ORTHOPAEDIC Right 2005    repair of fractured patella    HX OTHER SURGICAL  1977    splenectomy, lacerated pancreas    HX TONSILLECTOMY      VT ABDOMEN SURGERY PROC UNLISTED      cholecystectomy    VT ABDOMEN SURGERY PROC UNLISTED      lysis of adhesions X3       Prior Level of Function/Environment/Context: Independent  Expanded or extensive additional review of patient history:   Home Situation  Home Environment: Private residence  # Steps to Enter: 6  Rails to Enter: Yes  Hand Rails : Left  One/Two Story Residence: Two story  Living Alone: No  Support Systems: Spouse/Significant Other  Patient Expects to be Discharged to[de-identified] Home  Current DME Used/Available at Home: Annalee Bi, straight,Shower chair  Tub or Shower Type: Shower    Hand dominance: Right    EXAMINATION OF PERFORMANCE DEFICITS:  Cognitive/Behavioral Status:  Neurologic State: Alert  Orientation Level: Oriented X4  Cognition: Appropriate decision making; Appropriate for age attention/concentration; Appropriate safety awareness  Perception: Appears intact  Perseveration: No perseveration noted  Safety/Judgement: Awareness of environment    Hearing: Auditory  Auditory Impairment: None    Vision/Perceptual:                                Corrective Lenses: Glasses    Range of Motion:  AROM: Within functional limits  PROM: Within functional limits                      Strength:  Strength: Within functional limits                Coordination:  Coordination: Within functional limits  Fine Motor Skills-Upper: Left Intact; Right Intact    Gross Motor Skills-Upper: Left Intact; Right Intact    Tone & Sensation:  Tone: Normal  Sensation: Intact                      Balance:  Sitting: Intact; Without support  Standing: Intact; Without support    Functional Mobility and Transfers for ADLs:  Bed Mobility:  Rolling: Independent  Supine to Sit: Independent  Sit to Supine: Independent  Scooting: Independent    Transfers:  Sit to Stand: Independent  Stand to Sit: Independent  Bed to Chair: Independent  Bathroom Mobility: Independent  Toilet Transfer : Independent    ADL Assessment:  Feeding: Independent    Oral Facial Hygiene/Grooming: Independent    Bathing: Independent    Upper Body Dressing: Independent    Lower Body Dressing: Independent    Toileting: Independent    ADL Intervention and task modifications:  Patient instructed and indicated understanding the benefits of maintaining activity tolerance, functional mobility, and independence with self care tasks during acute stay  to ensure safe return home and to baseline. Encouraged patient to increase frequency and duration OOB, be out of bed for all meals, perform daily ADLs (as approved by RN/MD regarding bathing etc), and performing functional mobility to/from bathroom. Cognitive Retraining  Safety/Judgement: Awareness of environment    Functional Measure:    Barthel Index:  Bathin  Bladder: 10  Bowels: 10  Groomin  Dressing: 10  Feeding: 10  Mobility: 15  Stairs: 10  Toilet Use: 10  Transfer (Bed to Chair and Back): 15  Total: 100/100      The Barthel ADL Index: Guidelines  1. The index should be used as a record of what a patient does, not as a record of what a patient could do. 2. The main aim is to establish degree of independence from any help, physical or verbal, however minor and for whatever reason. 3. The need for supervision renders the patient not independent. 4. A patient's performance should be established using the best available evidence. Asking the patient, friends/relatives and nurses are the usual sources, but direct observation and common sense are also important. However direct testing is not needed. 5. Usually the patient's performance over the preceding 24-48 hours is important, but occasionally longer periods will be relevant. 6. Middle categories imply that the patient supplies over 50 per cent of the effort.   7. Use of aids to be independent is allowed. Score Interpretation (from [facility] 83)    Independent   60-79 Minimally independent   40-59 Partially dependent   20-39 Very dependent   <20 Totally dependent     -Bharti Echols., Barthel, D.W. (1965). Functional evaluation: the Barthel Index. [address] [facility] ([address] [place] [address] (1997). The Barthel activities of daily living index: self-reporting versus actual performance in the old (> or = 75 years). Journal of [address] [place] [address] 45(7), [facility], J.VICENTEF, Sylvester Mark., Aiyana Burnett. (1999). Measuring the change in disability after inpatient rehabilitation; comparison of the responsiveness of the Barthel Index and Functional [place] Measure. Journal of Neurology, Neurosurgery, and Psychiatry, 66(4), 227-329. Rl Sexton, NNagaJ.A, ERNESTINE Augustine, & Yamilet Mccollum, MNagaA. (2004) Assessment of post-stroke quality of life in cost-effectiveness studies: The usefulness of the Barthel Index and the EuroQoL-5D. Quality of Life Research, 15, 324-39       Occupational Therapy Evaluation Charge Determination   History Examination Decision-Making   LOW Complexity : Brief history review  LOW Complexity : 1-3 performance deficits relating to physical, cognitive , or psychosocial skils that result in activity limitations and / or participation restrictions  LOW Complexity : No comorbidities that affect functional and no verbal or physical assistance needed to complete eval tasks       Based on the above components, the patient evaluation is determined to be of the following complexity level: LOW   Pain Rating:  No c/o pain    Activity Tolerance:   Good    After treatment patient left in no apparent distress:    Supine in bed, Call bell within reach and Side rails x 3    COMMUNICATION/EDUCATION:   The patients plan of care was discussed with: Physical therapist and Registered nurse.      Thank you for this referral.  Ritchie Koroma, OT  Time Calculation: 13 mins

## 2022-05-29 NOTE — PROGRESS NOTES
PT Note:    Orders received. Chart reviewed. Spoke with patient and observed him up ad rose in room managing IV pole without difficulty. Patient has no mobility or balance concerns at this time. Spoke with OT-confirms no mobility needs. Patient in agreement for PT to sign off without formal evaluation. Thank you.

## 2022-05-29 NOTE — PROGRESS NOTES
Occupational Therapy    Orders received, chart reviewed and patient evaluated by occupational therapy. Pending progression with skilled acute occupational therapy, recommend:  No skilled occupational therapy/ follow up rehabilitation needs identified at this time. Recommend with nursing ADLs with supervision/setup, OOB to chair 3x/day and toileting via functional mobility to and from bathroom with [place] and No DME. Thank you for completing as able in order to maintain patient strength, endurance and independence. Full evaluation to follow.      Thank you,  Lopez Cisneros, OT

## 2022-05-29 NOTE — PROGRESS NOTES
Medicare Outpatient Observation Notice (MOON) provided to patient with verbal explanation of the notice. Time allotted for questions regarding the notice. Patient provided a completed copy of the MOON notice. Copy placed on bedside chart.

## 2022-05-29 NOTE — DISCHARGE INSTRUCTIONS
Discharge Summary       PATIENT ID: Manan Read  MRN: [id number]   YOB: 1955    DATE OF ADMISSION: 5/27/2022  6:28 PM    DATE OF DISCHARGE: ***   PRIMARY CARE PROVIDER: Andres Aguilar NP     ATTENDING PHYSICIAN: ***  DISCHARGING PROVIDER: Olu Martinez MD    To contact this individual call [phone] and ask the  to page. If unavailable ask to be transferred the Adult Hospitalist Department. CONSULTATIONS: IP CONSULT TO VASCULAR SURGERY  IP CONSULT TO HOSPITALIST    PROCEDURES/SURGERIES: Procedure(s):  Open Right Femoral embolectomy with bovine patch angioplasty    DISCHARGE DIAGNOSES:   ***        ADMISSION SUMMARY AND HOSPITAL COURSE:   ***    DISCHARGE DIAGNOSES / PLAN:      ***    BMI: Body mass index is 30.25 kg/m². . This patient: {bmidischarge:61609:::0}    PENDING TEST RESULTS:   At the time of discharge the following test results are still pending: ***     ADDITIONAL CARE RECOMMENDATIONS:   ***     NOTIFY YOUR PHYSICIAN FOR ANY OF THE FOLLOWING:   Fever over 101 degrees for 24 hours. Chest pain, shortness of breath, fever, chills, nausea, vomiting, diarrhea, change in mentation, falling, weakness, bleeding. Severe pain or pain not relieved by medications, as well as any other signs or symptoms that you may have questions about.     FOLLOW UP APPOINTMENTS:    Follow-up Information     Follow up With Specialties Details Why Contact Info    Mayte Dempsey MD Vascular Surgery, General Surgery In 1 week post hospitalization visit,vacular surgery [facility]  [address] [facility] [address] [place] [address]      Andres Aguilar NP Nurse Practitioner  post hospitalization visit,acute ischemia rt foot, PVD, underwent surgery [address] [facility]  [phone]               DIET: {diet:85589}    ACTIVITY: {discharge activity:66688}    EQUIPMENT needed: ***    DISCHARGE MEDICATIONS:  {Medication reconciliation information is now added to the patient's AVS automatically when it is printed. There is no need to use this SmartLink in discharge instructions. Highlight this text and delete it to clear this message}      DISPOSITION:    Home With:   OT  PT  HH  RN       Long term SNF/Inpatient Rehab    Independent/assisted living    Hospice    Other:       PATIENT CONDITION AT DISCHARGE:     Functional status    Poor     Deconditioned     Independent      Cognition     Lucid     Forgetful     Dementia      Catheters/lines (plus indication)    Severino     PICC     PEG     None      Code status     Full code     DNR      PHYSICAL EXAMINATION AT DISCHARGE:  General:          Alert, cooperative, no distress, appears stated age. HEENT:           Atraumatic, anicteric sclerae, pink conjunctivae                          No oral ulcers, mucosa moist, throat clear, dentition fair  Neck:               Supple, symmetrical  Lungs:             Clear to auscultation bilaterally. No Wheezing or Rhonchi. No rales. Chest wall:      No tenderness  No Accessory muscle use. Heart:              Regular  rhythm,  No  murmur   No edema  Abdomen:        Soft, non-tender. Not distended. Bowel sounds normal  Extremities:     No cyanosis. No clubbing,                            Skin turgor normal, Capillary refill normal  Skin:                Not pale. Not Jaundiced  No rashes   Psych:             Not anxious or agitated. Neurologic:      Alert, moves all extremities, answers questions appropriately and responds to commands       CHRONIC MEDICAL DIAGNOSES:       Discharge Instructions       PATIENT ID: Rosalinda Bonilla  MRN: [id number]   YOB: 1955    DATE OF ADMISSION: 5/27/2022  6:28 PM    DATE OF DISCHARGE: 5/29/2022    PRIMARY CARE PROVIDER: Clara Monroe NP     ATTENDING PHYSICIAN: Jaja Hanna MD  DISCHARGING PROVIDER: Sulema Porter MD    To contact this individual call [phone] and ask the  to page.    If unavailable ask to be transferred the Adult Hospitalist Department. DISCHARGE DIAGNOSES     PROCEDURES/SURGERIES: Procedure(s):  Open Right Femoral embolectomy with bovine patch angioplasty     DISCHARGE DIAGNOSES:      Acute embolism & thrombosis of Rt femoral artery     CONSULTATIONS: IP CONSULT TO VASCULAR SURGERY  IP CONSULT TO HOSPITALIST    PROCEDURES/SURGERIES: Procedure(s):  Open Right Femoral embolectomy with bovine patch angioplasty    PENDING TEST RESULTS:   At the time of discharge the following test results are still pending: ***    FOLLOW UP APPOINTMENTS:   Follow-up Information     Follow up With Specialties Details Why Contact Info    Carlnie Sultana MD Vascular Surgery, General Surgery In 1 week post hospitalization visit,vacular surgery [facility]  [address]  [phone]      Dre Colvin NP Nurse Practitioner  post hospitalization visit,acute ischemia rt foot, PVD, underwent surgery [address] [facility]  [phone]             ADDITIONAL CARE RECOMMENDATIONS: ***    DIET: Cardiac Diet      ACTIVITY: Activity as tolerated  Radiology      DISCHARGE MEDICATIONS:   See Medication Reconciliation Form    · It is important that you take the medication exactly as they are prescribed. · Keep your medication in the bottles provided by the pharmacist and keep a list of the medication names, dosages, and times to be taken in your wallet. · Do not take other medications without consulting your doctor. NOTIFY YOUR PHYSICIAN FOR ANY OF THE FOLLOWING:   Fever over 101 degrees for 24 hours. Chest pain, shortness of breath, fever, chills, nausea, vomiting, diarrhea, change in mentation, falling, weakness, bleeding. Severe pain or pain not relieved by medications. Or, any other signs or symptoms that you may have questions about.       DISPOSITION:    Home With:self care    OT  PT  [facility]  RN       SNF/Inpatient Rehab/LTAC    Independent/assisted living    Hospice    Other:     CDMP Checked:   Yes *** PROBLEM LIST Updated:  Yes ***       Signed:   Aria Arango MD  5/29/2022  3:32 PM

## 2022-05-29 NOTE — PROGRESS NOTES
I have reviewed discharge instructions with the patient including prescriptions(written and handed to patient) for specific instruction for the first 7 days then to continue from that time on for oral eliquis. Patient also aware to call PCP that sent him to ED and report hospitalization for post hospitalization f/u appt. Dr Navi Matos ([facility]. Surgeon's office to call patient Tuesday 5/31 to schedule f/u appt. The patient  verbalized understanding of all DC instructions and is awaiting ride home from family.

## 2022-05-29 NOTE — PROGRESS NOTES
Doing very well after embolectomy yesterday. Strong palp pedal pulses. Cyanosis of the 5th toe much improved and line of cyanosis is migrating distally. R femoral incision healing appropriately    --OK for DC  --Patient will need to DC on DOAC  --Patient should see me in the office in 7-10 days for evaluation.  Will arrange for FU with hematology at that time

## 2022-05-29 NOTE — DISCHARGE SUMMARY
Discharge Summary       PATIENT ID: Pierre Mitchell  MRN: [id number]   YOB: 1955    DATE OF ADMISSION: 5/27/2022  6:28 PM    DATE OF DISCHARGE: 5/29/22  PRIMARY CARE PROVIDER: Melissa Saenz NP     ATTENDING PHYSICIAN:   DISCHARGING PROVIDER: Eligio Lockhart MD    To contact this individual call [phone] and ask the  to page. If unavailable ask to be transferred the Adult Hospitalist Department. CONSULTATIONS: IP CONSULT TO VASCULAR SURGERY  IP CONSULT TO HOSPITALIST    PROCEDURES/SURGERIES: Procedure(s):  Open Right Femoral embolectomy with bovine patch angioplasty    DISCHARGE DIAGNOSES:     Acute embolism & thrombosis of Rt femoral artery       ADMISSION SUMMARY AND HOSPITAL COURSE:   74 y. o. 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 Had arterial duplex demonstrating filing defect within right sfa. He was transported to [facility] for vascular surgery consult.  Patient states he had noted pain and discoloration of his right lower extremity fifth digit several weeks ago.  Reported this to his PCP who tested him for gout and eventually treated him for cellulitis.  Symptoms did not improve, prompting him to seek emergency room care.     The patient denies any fever, chills, chest or abdominal pain, nausea, vomiting, cough, congestion, recent illness, palpitations, or dysuria.     Remarkable vitals on ER Presentations: bp 142/79  Labs Remarkable for: wbc 13.0, hgb 17.1,   ER Images: XR R. Foot: no acute process.    Duplex Bilat LEs: The right middle superficial femoral artery is occluded.   ER Rx: Morphine 4mg, Heparin gtt    DISCHARGE DIAGNOSES / PLAN:      Acute Right foot ischemia   Acute Right middle superficial femoral artery occlusion  H/o PVD  -Embolic w/u in process:   -CTA abd,Thrombosis,Rt ,mid,and distal SFA,not completely occlusive  -CTA chest, no overt systemic embolic source  -lipids, LDL 52,  -echo pending   -Heparin gtt  -Vascular surgery consult appreciated,has acute embolism & thrombosis of Rt femoral artery ,    Underwent surgery 5/28/22    -Open right femoral exposure  -Right femoral, SFA, popliteal mechanical minnie embolectomy   -Right femoral artery bovine patch angioplasty   5/29, heparin drip stopped           Per Dr Navi Matos ,pt can be discharged on Eliquis, with out pt f/u            Pt to follow up with Hemotolgy, for full work as out pt, Dr Navi Matos will also f//u on that    Post op Leukocytosis   -no fever  -UA normal   -likely reactive     PT/OT cleared him     BMI: Body mass index is 30.25 kg/m². . This patient: Meets criteria for overweight given BMI >/= 25 and < 30 due to excess calories/nutritional. Weight loss and lifestyle modifications should be encouraged as an outpatient. ADDITIONAL CARE RECOMMENDATIONS:       NOTIFY YOUR PHYSICIAN FOR ANY OF THE FOLLOWING:   Fever over 101 degrees for 24 hours. Chest pain, shortness of breath, fever, chills, nausea, vomiting, diarrhea, change in mentation, falling, weakness, bleeding. Severe pain or pain not relieved by medications, as well as any other signs or symptoms that you may have questions about.     FOLLOW UP APPOINTMENTS:    Follow-up Information     Follow up With Specialties Details Why Contact Info    Hugh Moreno MD Vascular Surgery, General Surgery In 1 week post hospitalization visit,vacular surgery [facility]  [address] [facility] [address] [place] [address]., NP Nurse Practitioner  post hospitalization visit,acute ischemia rt foot, PVD, underwent surgery [facility]  [phone]               DIET: Cardiac Diet    ACTIVITY: Activity as tolerated        DISCHARGE MEDICATIONS:  Current Discharge Medication List      START taking these medications    Details   !! acetaminophen (TYLENOL) 325 mg tablet Take 2 Tablets by mouth every six (6) hours as needed for Pain.  Qty: 20 Tablet, Refills: 0  Start date: 5/29/2022      !! apixaban (ELIQUIS) 5 mg tablet Take 2 Tablets by mouth two (2) times a day for 14 doses. Qty: 28 Tablet, Refills: 0  Start date: 5/29/2022, End date: 6/5/2022      !! apixaban (ELIQUIS) 5 mg tablet Take 1 Tablet by mouth two (2) times a day for 60 doses. Indications: deep vein thrombosis prevention, treatment to prevent a blood clot in the lung  Qty: 60 Tablet, Refills: 0  Start date: 5/29/2022, End date: 6/28/2022       !! - Potential duplicate medications found. Please discuss with provider. CONTINUE these medications which have NOT CHANGED    Details   !! acetaminophen (TYLENOL EXTRA STRENGTH) 500 mg tablet Take 500 mg by mouth every six (6) hours as needed for Pain. !! - Potential duplicate medications found. Please discuss with provider. DISPOSITION:    Home With:self care   OT  PT  HH  RN       Long term SNF/Inpatient Rehab    Independent/assisted living    Hospice    Other:       PATIENT CONDITION AT DISCHARGE:     Functional status    Poor     Deconditioned    xx Independent      Cognition     Lucid     Forgetful     Dementia      Catheters/lines (plus indication)    Severino     PICC     PEG    xx None      Code status    xx Full code     DNR      PHYSICAL EXAMINATION AT DISCHARGE:  General:          Alert, cooperative, no distress, appears stated age. HEENT:           Atraumatic, anicteric sclerae, pink conjunctivae                          No oral ulcers, mucosa moist, throat clear, dentition fair  Neck:               Supple, symmetrical  Lungs:             Clear to auscultation bilaterally. No Wheezing or Rhonchi. No rales. Chest wall:      No tenderness  No Accessory muscle use. Heart:              Regular  rhythm,  No  murmur   No edema  Abdomen:        Soft, non-tender. Not distended.   Bowel sounds normal  Extremities:     R foot, cyanosis 5th toe improved,non tender foot ,no edema                           Skin turgor normal, Capillary refill normal 2+ pulses  Skin:                Not pale. Not Jaundiced  No rashes   Psych:             Not anxious or agitated.   Neurologic:      Alert, moves all extremities, answers questions appropriately and responds to commands       CHRONIC MEDICAL DIAGNOSES:  Problem List as of 5/29/2022 Never Reviewed          Codes Class Noted - Resolved    Ischemia of right lower extremity ICD-10-CM: I99.8  ICD-9-CM: 459.9  5/27/2022 - Present              Greater than 30  minutes were spent with the patient on counseling and coordination of care    Signed:   Robe Austin MD  5/29/2022  3:12 PM

## 2022-09-29 ENCOUNTER — TRANSCRIBE ORDER (OUTPATIENT)
Dept: SCHEDULING | Age: 67
End: 2022-09-29

## 2022-09-29 DIAGNOSIS — I74.3 EMBOLISM AND THROMBOSIS OF ARTERIES OF LOWER EXTREMITY (HCC): Primary | ICD-10-CM

## (undated) DEVICE — TIBURON SPLIT SHEET: Brand: CONVERTORS

## (undated) DEVICE — GLOVE ORTHO 8   MSG9480

## (undated) DEVICE — ABDOMINAL PAD: Brand: DERMACEA

## (undated) DEVICE — SOLUTION IRRIG 3000ML LAC R FLX CONT

## (undated) DEVICE — SUT PROL 6-0 24IN BV1 DA BLU --

## (undated) DEVICE — INFECTION CONTROL KIT SYS

## (undated) DEVICE — PENCIL SMK EVAC 10 FT BLADE ELECTRD ROCKER FOR TELSCP

## (undated) DEVICE — SUT ETHLN 3-0 18IN PS2 BLK --

## (undated) DEVICE — 4.5 MM HELICUT STRAIGHT BURRS,                                    POWER/EP-1, SLATE, 5000 MAXIMUM RPM,                                    PACKAGED 6 PER BOX, STERILE: Brand: DYONICS HELICUT

## (undated) DEVICE — MEDI-VAC NON-CONDUCTIVE SUCTION TUBING: Brand: CARDINAL HEALTH

## (undated) DEVICE — PAD,NON-ADHERENT,3X8,STERILE,LF,1/PK: Brand: MEDLINE

## (undated) DEVICE — Z CONVERTED USE 2271148 CONNECTOR TBNG POLYPR 5IN1 TOUGH SHATTERPROOF FOR 5-11MM TB

## (undated) DEVICE — 3M™ TEGADERM™ TRANSPARENT FILM DRESSING FRAME STYLE, 1626W, 4 IN X 4-3/4 IN (10 CM X 12 CM), 50/CT 4CT/CASE: Brand: 3M™ TEGADERM™

## (undated) DEVICE — SYR 3ML LL TIP 1/10ML GRAD --

## (undated) DEVICE — SYR 10ML LUER LOK 1/5ML GRAD --

## (undated) DEVICE — SUPER TURBOVAC 90 INTEGRATED CABLE WAND ICW: Brand: COBLATION

## (undated) DEVICE — 3M™ STERI-DRAPE™ U-DRAPE 1015: Brand: STERI-DRAPE™

## (undated) DEVICE — ARTHROSCOPY RICHMOND-LF: Brand: MEDLINE INDUSTRIES, INC.

## (undated) DEVICE — MAT SUCT W36XL48IN FLD CTRL DISP

## (undated) DEVICE — CURITY NON-ADHERENT STRIPS: Brand: CURITY

## (undated) DEVICE — SOLUTION IRRIG 1000ML STRL H2O USP PLAS POUR BTL

## (undated) DEVICE — GAUZE SPONGES,12 PLY: Brand: CURITY

## (undated) DEVICE — SUTURE VCRL SZ 3-0 L27IN ABSRB UD L26MM SH 1/2 CIR J416H

## (undated) DEVICE — SUTURE VCRL SZ 2-0 L36IN ABSRB UD L40MM CT 1/2 CIR J957H

## (undated) DEVICE — 4.5 MM INCISOR PLUS STRAIGHT                                    BLADES, POWER/EP-1, VIOLET, PACKAGED                                    6 PER BOX, STERILE

## (undated) DEVICE — (D)PREP SKN CHLRAPRP APPL 26ML -- CONVERT TO ITEM 371833

## (undated) DEVICE — VASCULAR-SMH: Brand: MEDLINE INDUSTRIES, INC.

## (undated) DEVICE — SURGIFOAM SPNG SZ 100

## (undated) DEVICE — 4-PORT MANIFOLD: Brand: NEPTUNE 2

## (undated) DEVICE — SOLUTION IRRIG 1000ML 0.9% SOD CHL USP POUR PLAS BTL

## (undated) DEVICE — DERMABOND SKIN ADH 0.7ML -- DERMABOND ADVANCED 12/BX

## (undated) DEVICE — STERILE POLYISOPRENE POWDER-FREE SURGICAL GLOVES WITH EMOLLIENT COATING: Brand: PROTEXIS

## (undated) DEVICE — GOWN,SIRUS,NONRNF,SETINSLV,2XL,18/CS: Brand: MEDLINE

## (undated) DEVICE — STERILE POLYISOPRENE POWDER-FREE SURGICAL GLOVES: Brand: PROTEXIS

## (undated) DEVICE — BAG ISOL TRNSPRT 18X18.5IN LF --

## (undated) DEVICE — REM POLYHESIVE ADULT PATIENT RETURN ELECTRODE: Brand: VALLEYLAB

## (undated) DEVICE — HANDLE LT SNAP ON ULT DURABLE LENS FOR TRUMPF ALC DISPOSABLE

## (undated) DEVICE — SUTURE MCRYL SZ 4-0 L27IN ABSRB UD L19MM PS-2 1/2 CIR PRIM Y426H

## (undated) DEVICE — SUT PROL 6-0 18IN BV1 DA BLU --

## (undated) DEVICE — CLEAR-TRAC THREADED CANNULA WITH                                    OBTURATOR 5 MM X 76 MM, LATEX FREE,                                    BOX OF 10: Brand: CLEAR-TRAC

## (undated) DEVICE — AGENT HEMSTAT W4XL4IN OXIDIZED REGENERATED CELOS ABSRB SFT

## (undated) DEVICE — DRAPE,U/ SHT,SPLIT,PLAS,STERIL: Brand: MEDLINE

## (undated) DEVICE — 10K/24K ARTHROSCOPY INFLOW TUBE SET: Brand: 10K/24K

## (undated) DEVICE — CANNULA ARTHSCP L7CM DIA7MM TRNSLUC THRD FLX W/ NO SQUIRT

## (undated) DEVICE — SOL INJ SOD CL 0.9% 500ML BG --

## (undated) DEVICE — INTENDED FOR TISSUE SEPARATION, AND OTHER PROCEDURES THAT REQUIRE A SHARP SURGICAL BLADE TO PUNCTURE OR CUT.: Brand: BARD-PARKER ® CARBON RIB-BACK BLADES

## (undated) DEVICE — SHOULDER SUSPENSION KIT 6 PER BOX

## (undated) DEVICE — DUAL IRRIGATION ADAPTOR

## (undated) DEVICE — DEVON™ KNEE AND BODY STRAP 60" X 3" (1.5 M X 7.6 CM): Brand: DEVON

## (undated) DEVICE — HYPODERMIC SAFETY NEEDLE: Brand: MONOJECT

## (undated) DEVICE — LIGHT HANDLE: Brand: DEVON

## (undated) DEVICE — 3M™ IOBAN™ 2 ANTIMICROBIAL INCISE DRAPE 6651EZ: Brand: IOBAN™ 2

## (undated) DEVICE — SUTURE PROL SZ 5-0 L30IN NONABSORBABLE BLU L13MM RB-2 1/2 8710H